# Patient Record
Sex: MALE | Race: WHITE | HISPANIC OR LATINO | Employment: FULL TIME | URBAN - METROPOLITAN AREA
[De-identification: names, ages, dates, MRNs, and addresses within clinical notes are randomized per-mention and may not be internally consistent; named-entity substitution may affect disease eponyms.]

---

## 2017-03-13 ENCOUNTER — HOSPITAL ENCOUNTER (EMERGENCY)
Facility: MEDICAL CENTER | Age: 62
End: 2017-03-13
Attending: EMERGENCY MEDICINE
Payer: COMMERCIAL

## 2017-03-13 ENCOUNTER — OCCUPATIONAL MEDICINE (OUTPATIENT)
Dept: URGENT CARE | Facility: PHYSICIAN GROUP | Age: 62
End: 2017-03-13
Payer: COMMERCIAL

## 2017-03-13 VITALS
OXYGEN SATURATION: 98 % | DIASTOLIC BLOOD PRESSURE: 86 MMHG | BODY MASS INDEX: 30.12 KG/M2 | TEMPERATURE: 98 F | WEIGHT: 169.97 LBS | RESPIRATION RATE: 18 BRPM | SYSTOLIC BLOOD PRESSURE: 135 MMHG | HEART RATE: 64 BPM

## 2017-03-13 VITALS — RESPIRATION RATE: 14 BRPM

## 2017-03-13 DIAGNOSIS — T15.91XA EYE FOREIGN BODY, RIGHT, INITIAL ENCOUNTER: ICD-10-CM

## 2017-03-13 DIAGNOSIS — S05.91XA RIGHT EYE INJURY, INITIAL ENCOUNTER: ICD-10-CM

## 2017-03-13 DIAGNOSIS — T15.91XA FOREIGN BODY, EYE, RIGHT, INITIAL ENCOUNTER: ICD-10-CM

## 2017-03-13 DIAGNOSIS — Z02.1 PRE-EMPLOYMENT DRUG SCREENING: ICD-10-CM

## 2017-03-13 LAB
AMP AMPHETAMINE: NORMAL
BREATH ALCOHOL COMMENT: NORMAL
COC COCAINE: NORMAL
INT CON NEG: NEGATIVE
INT CON POS: POSITIVE
MET METHAMPHETAMINES: NORMAL
OPI OPIATES: NORMAL
PCP PHENCYCLIDINE: NORMAL
POC BREATHALIZER: 0 PERCENT (ref 0–0.01)
POC DRUG COMMENT 753798-OCCUPATIONAL HEALTH: NORMAL
THC: NORMAL

## 2017-03-13 PROCEDURE — 99284 EMERGENCY DEPT VISIT MOD MDM: CPT

## 2017-03-13 PROCEDURE — 99202 OFFICE O/P NEW SF 15 MIN: CPT | Mod: 29 | Performed by: PHYSICIAN ASSISTANT

## 2017-03-13 PROCEDURE — 80305 DRUG TEST PRSMV DIR OPT OBS: CPT | Performed by: PHYSICIAN ASSISTANT

## 2017-03-13 PROCEDURE — 65205 REMOVE FOREIGN BODY FROM EYE: CPT

## 2017-03-13 PROCEDURE — 82075 ASSAY OF BREATH ETHANOL: CPT | Mod: 29 | Performed by: PHYSICIAN ASSISTANT

## 2017-03-13 PROCEDURE — 303754 HCHG EYE PATCH

## 2017-03-13 RX ORDER — BACITRACIN 500 [USP'U]/G
OINTMENT OPHTHALMIC
Qty: 1 TUBE | Refills: 0 | Status: SHIPPED | OUTPATIENT
Start: 2017-03-13 | End: 2023-05-02

## 2017-03-13 RX ORDER — ERYTHROMYCIN 5 MG/G
1 OINTMENT OPHTHALMIC 3 TIMES DAILY
Qty: 1 TUBE | Refills: 0 | Status: SHIPPED | OUTPATIENT
Start: 2017-03-13 | End: 2017-03-13

## 2017-03-13 RX ORDER — HYDROCODONE BITARTRATE AND ACETAMINOPHEN 5; 325 MG/1; MG/1
1 TABLET ORAL EVERY 4 HOURS PRN
Qty: 10 TAB | Refills: 0 | Status: SHIPPED | OUTPATIENT
Start: 2017-03-13

## 2017-03-13 ASSESSMENT — PAIN SCALES - GENERAL
PAINLEVEL_OUTOF10: 4
PAINLEVEL_OUTOF10: 2
PAINLEVEL: 6=MODERATE PAIN

## 2017-03-13 ASSESSMENT — ENCOUNTER SYMPTOMS
NAUSEA: 0
EYE PAIN: 1
BLURRED VISION: 1
NEUROLOGICAL NEGATIVE: 1
EYE REDNESS: 1
EYE REDNESS: 1
VOMITING: 0
BLURRED VISION: 1
EYE PAIN: 1
CONSTITUTIONAL NEGATIVE: 1

## 2017-03-13 ASSESSMENT — LIFESTYLE VARIABLES: DO YOU DRINK ALCOHOL: NO

## 2017-03-13 NOTE — Clinical Note
EMPLOYEE’S CLAIM FOR COMPENSATION/ REPORT OF INITIAL TREATMENT  FORM C-4    EMPLOYEE’S CLAIM - PROVIDE ALL INFORMATION REQUESTED   First Name  Salvador Last Name  Rachel Birthdate                    1955                Sex  male Claim Number   Home Address  [address] Age  61 y.o. Height  5'3 Weight  169 lbs [facility]     [facility] Zip  [address] Telephone  [phone] (home)    Mailing Address  [address] [facility] Zip  [address] Primary Language Spoken  English    Insurer   Third Party   [employer]   Employee's Occupation (Job Title) When Injury or Occupational Disease Occurred      Employer's Name  [employer]  Telephone  [phone]    Employer Address  [address]  Zip  [address]    Date of Injury  3/10/2017               Hour of Injury  12:30 AM Date Employer Notified  3/12/2017 Last Day of Work after Injury or Occupational Disease  3/13/2017 Supervisor to Whom Injury Reported  [facility]   Address or Location of Accident (if applicable)  [place]   What were you doing at the time of accident? (if applicable)  RUNNING THE WHEEL[employer]RATOR    How did this injury or occupational disease occur? (Be specific an answer in detail. Use additional sheet if necessary)  Grit Flew Into Right Eye   If you believe that you have an occupational disease, when did you first have knowledge of the disability and it relationship to your employment?  N/A Witnesses to the Accident  None      Nature of Injury or Occupational Disease  Foreign Body  Part(s) of Body Injured or Affected  Eye (R), ,     I certify that the above is true and correct to the best of my knowledge and that I have provided this information in order to obtain the benefits of [place]  I hereby authorize any physician, chiropractor, surgeon, practitioner, or other person, any hospital, including [facility] or [facility] hospital, any medical service organization, any insurance company, or other institution or organization to release to each other, any medical or other information, including benefits paid or payable, pertinent to this injury or disease, except information relative to diagnosis, treatment and/or counseling for AIDS, psychological conditions, alcohol or controlled substances, for which I must give specific authorization.  A Photostat of this authorization shall be as valid as the original.     Date   Place   Employee’s Signature   THIS REPORT MUST BE COMPLETED AND MAILED WITHIN 3 WORKING DAYS OF TREATMENT   Place  [facility]  Name of Facility  [facility]   Date  3/13/2017 Diagnosis  (S05.91XA) Right eye injury, initial encounter  (T15.91XA) Foreign body, eye, right, initial encounter Is there evidence the injured employee was under the influence of alcohol and/or another controlled substance at the time of accident?   Hour  9:53 AM Description of Injury or Disease  Diagnoses of Right eye injury, initial encounter and Foreign body, eye, right, initial encounter were pertinent to this visit. No   Treatment  Patient referred to ED for further eye care immediately     Unable to do adequate exam and likely FB removal.    Referred to [facility] ED for availability of Ophthamology as deemed appropriate by ERP.   Have you advised the patient to remain off work five days or more? No   X-Ray Findings      If Yes   From Date  To Date      From information given by the employee, together with medical evidence, can you directly connect this injury or occupational disease as job incurred?  Yes If No Full Duty  No Modified Duty  Yes   Is additional medical care by a physician indicated?  Yes If Modified Duty, Specify any  "Limitations / Restrictions  Determine degree after ED visit and final dx    Do you know of any previous injury or disease contributing to this condition or occupational disease?                            No   Date  3/13/2017 Print Doctor’s Name Mk Crews PA-C I certify the employer’s copy of  this form was mailed on:   Address  [address] Insurer’s Use Only     [place] Zip  [address]    Provider’s Tax ID Number  [id number]  Telephone  Dept: [phone]        e-MK Villanueva PA-C   e-Signature: Dr. Anish Pugh, Medical Director Degree  ROG[employer]        ORIGINAL-TREATING PHYSICIAN OR CHIROPRACTOR    PAGE 2-INSURER/TPA    PAGE 3-EMPLOYER    PAGE 4-EMPLOYEE             Form C-4 (rev10/07)              BRIEF DESCRIPTION OF RIGHTS AND BENEFITS  (Pursuant to [place]RS 616C.050)    Notice of Injury or Occupational Disease (Incident Report Form C-1): If an injury or occupational disease (OD) arises out of and in the  course of employment, you must provide written notice to your employer as soon as practicable, but no later than 7 days after the accident or  OD. Your employer shall maintain a sufficient supply of the required forms.    Claim for Compensation (Form C-4): If medical treatment is sought, the form C-4 is available at the place of initial treatment. A completed  \"Claim for Compensation\" (Form C-4) must be filed within 90 days after an accident or OD. The treating physician or chiropractor must,  within 3 working days after treatment, complete and mail to the employer, the employer's insurer and third-party , the Claim for  Compensation.    Medical Treatment: If you require medical treatment for your on-the-job injury or OD, you may be required to select a physician or  chiropractor from a list provided by your workers’ compensation insurer, if it has contracted with an Organization for Managed Care (MCO) or  Preferred Provider Organization (PPO) or " providers of health care. If your employer has not entered into a contract with an MCO or PPO, you  may select a physician or chiropractor from the Panel of Physicians and Chiropractors. Any medical costs related to your industrial injury or  OD will be paid by your insurer.    Temporary Total Disability (TTD): If your doctor has certified that you are unable to work for a period of at least 5 consecutive days, or 5  cumulative days in a 20-day period, or places restrictions on you that your employer does not accommodate, you may be entitled to TTD  compensation.    Temporary Partial Disability (TPD): If the wage you receive upon reemployment is less than the compensation for TTD to which you are  entitled, the insurer may be required to pay you TPD compensation to make up the difference. TPD can only be paid for a maximum of 24  months.    Permanent Partial Disability (PPD): When your medical condition is stable and there is an indication of a PPD as a result of your injury or  OD, within 30 days, your insurer must arrange for an evaluation by a rating physician or chiropractor to determine the degree of your PPD. The  amount of your PPD award depends on the date of injury, the results of the PPD evaluation and your age and wage.    Permanent Total Disability (PTD): If you are medically certified by a treating physician or chiropractor as permanently and totally disabled  and have been granted a PTD status by your insurer, you are entitled to receive monthly benefits not to exceed 66 2/3% of your average  monthly wage. The amount of your PTD payments is subject to reduction if you previously received a PPD award.    Vocational Rehabilitation Services: You may be eligible for vocational rehabilitation services if you are unable to return to the job due to a  permanent physical impairment or permanent restrictions as a result of your injury or occupational disease.    Transportation and Per Shobha Reimbursement: You  may be eligible for travel expenses and per john associated with medical treatment.    Reopening: You may be able to reopen your claim if your condition worsens after claim closure.    Appeal Process: If you disagree with a written determination issued by the insurer or the insurer does not respond to your request, you may  appeal to the Department of Administration, , by following the instructions contained in your determination letter. You must  appeal the determination within 70 days from the date of the determination letter at [address], [address], [place], [place]  [address], or [address], [address], [place], [place] [address]. If you disagree with the  decision, you may appeal to the  Department of Administration, . You must file your appeal within 30 days from the date of the  decision  letter at [address], [address], [place], [place] [address], or [address], [address], [place], [place] [address]. If you  disagree with a decision of an , you may file a petition for judicial review with the District Court. You must do so within 30  days of the Appeal Officer’s decision. You may be represented by an  at your own expense or you may contact the [facility] for possible  representation.    [place]  for Injured Workers (NAIW): If you disagree with a  decision, you may request that NAIW represent you  without charge at an  Hearing. For information regarding denial of benefits, you may contact the [facility] at: [address], [address], [place] [address], [phone], or [address], [address], [place] [address], [phone]    To File a Complaint with the Division: If you wish to file a complaint with the  of the Division of Industrial Relations (DIR),  please contact the Workers’ Compensation Section, [address],  [address], [place], [place] [address], telephone ([phone]) [phone], or  [address], [address], [place], [place] [address], telephone [phone].    For assistance with Workers’ Compensation Issues: you may contact the Office of the Governor Consumer Health Assistance, [address] [place] [address], [address], [place], [place] [address], Toll Free 1-284.875.4151, Web site: http://govcha.[facility].nv., E-mail  [email or web address].                                                                                                                                                                                                                                   __________________________________________________________________                                                                   _________________                Employee Name / Signature                                                                                                                                                       Date                                                                                                                                                                                                     D-2 (rev. 10/07)

## 2017-03-13 NOTE — LETTER
[facility], EMERGENCY DEPT   [address], [place] [address]  Phone: Dept: [phone] - Fax:        [facility] Progress Report and Disability Certification  Date of Service: 3/13/2017   No Show:  No  Date / Time of Next Visit:     Claim Information   Patient Name: Salvador Ramos  Claim Number:     Employer: [employer]  Date of Injury:      Insurer / TPA:   ID / SSN: [id number]    Occupation:  Diagnosis: The encounter diagnosis was Eye foreign body, right, initial encounter.    Medical Information   Related to Industrial Injury?   ***   Subjective Complaints:      Objective Findings:     Pre-Existing Condition(s):     Assessment:        Status:    Permanent Disability:     Plan:      Diagnostics:      Comments:       Disability Information   Status:      From:     Through:   Restrictions are:     Physical Restrictions   Sitting:    Standing:    Stooping:    Bending:      Squatting:    Walking:    Climbing:    Pushing:      Pulling:    Other:    Reaching Above Shoulder (L):   Reaching Above Shoulder (R):       Reaching Below Shoulder (L):    Reaching Below Shoulder (R):      Not to exceed Weight Limits   Carrying(hrs):   Weight Limit(lb):   Lifting(hrs):   Weight  Limit(lb):     Comments:      Repetitive Actions   Hands: i.e. Fine Manipulations from Grasping:     Feet: i.e. Operating Foot Controls:     Driving / Operate Machinery:     Physician Name: Isaak Pryor Physician Signature: ISAAK Bennett M.D. e-Signature:  , Medical Director   Clinic Name / Location: [facility], EMERGENCY DEPT  [address] [place] [id number] [phone]     Clinic Phone Number: Dept: [phone]   Appointment Time:  Visit Start Time:    Check-In Time:  10:58 AM Visit Discharge Time:    Original-Treating Physician or Chiropractor    Page 2-Insurer/TPA    Page 3-Employer    Page 4-Employee

## 2017-03-13 NOTE — LETTER
[facility], EMERGENCY DEPT   [address] [place], [place] [address]  Phone: Dept: [phone] - Fax:        [facility] Progress Report and Disability Certification  Date of Service: 3/13/2017   No Show:  No  Date / Time of Next Visit:     Claim Information   Patient Name: Salvador Ramos  Claim Number:     Employer: [employer]  Date of Injury:      Insurer / TPA:   ID / SSN: [id number]    Occupation:  Diagnosis: The encounter diagnosis was Eye foreign body, right, initial encounter.    Medical Information   Related to Industrial Injury?  yes   Subjective Complaints:      Objective Findings:     Pre-Existing Condition(s):     Assessment:        Status:    Permanent Disability:     Plan:      Diagnostics:      Comments:       Disability Information   Status:      From:     Through:   Restrictions are:     Physical Restrictions   Sitting:    Standing:    Stooping:    Bending:      Squatting:    Walking:    Climbing:    Pushing:      Pulling:    Other:    Reaching Above Shoulder (L):   Reaching Above Shoulder (R):       Reaching Below Shoulder (L):    Reaching Below Shoulder (R):      Not to exceed Weight Limits   Carrying(hrs):   Weight Limit(lb):   Lifting(hrs):   Weight  Limit(lb):     Comments:      Repetitive Actions   Hands: i.e. Fine Manipulations from Grasping:     Feet: i.e. Operating Foot Controls:     Driving / Operate Machinery:     Physician Name: Isaak Pryor Physician Signature: ISAAK Bennett M.D. e-Signature:  , Medical Director   Clinic Name / Location: [facility], EMERGENCY DEPT  [address] [place] [id number] [phone]     Clinic Phone Number: Dept: [phone]   Appointment Time:  Visit Start Time:    Check-In Time:  10:58 AM Visit Discharge Time:    Original-Treating Physician or Chiropractor    Page 2-Insurer/TPA    Page 3-Employer    Page 4-Employee

## 2017-03-13 NOTE — LETTER
FORM C-4:  EMPLOYEE’S CLAIM FOR COMPENSATION/ REPORT OF INITIAL TREATMENT  EMPLOYEE’S CLAIM - PROVIDE ALL INFORMATION REQUESTED   First Name  Salvador Last Name  Rachel Birthdate             Age  1955 61 y.o. 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 (if applicable)      How did this injury or occupational disease occur? Be specific and answer in detail. Use additional sheet if necessary)     If you believe that you have an occupational disease, when did you first have knowledge of the disability and it relationship to your employment?   Witnesses to the Accident       Nature of Injury or Occupational Disease    Part(s) of Body Injured or Affected  , ,     I certify that the above is true and correct to the best of my knowledge and that I have provided this information in order to obtain the benefits of [place]  I hereby authorize any physician, chiropractor, surgeon, practitioner, or other person, any hospital, including  [facility] or [facility] hospital, any medical service organization, any insurance company, or other institution or organization to release to each other, any medical or other information, including benefits paid or payable, pertinent to this injury or disease, except information relative to diagnosis, treatment and/or counseling for AIDS, psychological conditions, alcohol or controlled substances, for which I must give specific authorization.  A Photostat of this authorization shall be as valid as the original.   Date Place   Employee’s Signature   THIS REPORT MUST BE COMPLETED AND MAILED WITHIN 3 WORKING DAYS OF TREATMENT   Place  [facility], EMERGENCY DEPT  Name of Facility   [facility]   Date  3/13/2017 Diagnosis  No diagnosis found. Is there evidence the injured employee was under the influence of alcohol and/or another controlled substance at the time of accident?   Hour  3:19 PM Description of Injury or Disease       Treatment     Have you advised the patient to remain off work five days or more?             X-Ray Findings      If Yes   From Date    To Date      From information given by the employee, together with medical evidence, can you directly connect this injury or occupational disease as job incurred?    If No, is the employee capable of: Full Duty    Modified Duty      Is additional medical care by a physician indicated?    If Modified Duty, Specify any Limitations / Restrictions        Do you know of any previous injury or disease contributing to this condition or occupational disease?      Date  3/13/2017 Print Doctor’s Name  Isaak Pryor certify the employer’s copy of this form was mailed on:   Address  [address] [place] [address] [phone] Insurer’s Use Only   [facility]  [address]    Provider’s Tax ID Number  [id number] Telephone  Dept: [phone]    Doctor’s Signature    Degree       Original -  "TREATING PHYSICIAN OR CHIROPRACTOR   Pg 2-Insurer/TPA   Pg 3-Employer   Pg 4-Employee                                                                                                  Form C-4 (rev01/03)     BRIEF DESCRIPTION OF RIGHTS AND BENEFITS  (Pursuant to [place] 616[place])    Notice of Injury or Occupational Disease (Incident Report Form C-1): If an injury or occupational disease (OD) arises out of and in the course of employment, you must provide written notice to your employer as soon as practicable, but no later than 7 days after the accident or OD. Your employer shall maintain a sufficient supply of the required forms.    Claim for Compensation (Form C-4): If medical treatment is sought, the form C-4 is available at the place of initial treatment. A completed \"Claim for Compensation\" (Form C-4) must be filed within 90 days after an accident or OD. The treating physician or chiropractor must, within 3 working days after treatment, complete and mail to the employer, the employer's insurer and third-party , the Claim for Compensation.    Medical Treatment: If you require medical treatment for your on-the-job injury or OD, you may be required to select a physician or chiropractor from a list provided by your workers’ compensation insurer, if it has contracted with an Organization for Managed Care (MCO) or Preferred Provider Organization (PPO) or providers of health care. If your employer has not entered into a contract with an MCO or PPO, you may select a physician or chiropractor from the Panel of Physicians and Chiropractors. Any medical costs related to your industrial injury or OD will be paid by your insurer.    Temporary Total Disability (TTD): If your doctor has certified that you are unable to work for a period of at least 5 consecutive days, or 5 cumulative days in a 20-day period, or places restrictions on you that your employer does not accommodate, you may be entitled to TTD " compensation.    Temporary Partial Disability (TPD): If the wage you receive upon reemployment is less than the compensation for TTD to which you are entitled, the insurer may be required to pay you TPD compensation to make up the difference. TPD can only be paid for a maximum of 24 months.    Permanent Partial Disability (PPD): When your medical condition is stable and there is an indication of a PPD as a result of your injury or OD, within 30 days, your insurer must arrange for an evaluation by a rating physician or chiropractor to determine the degree of your PPD. The amount of your PPD award depends on the date of injury, the results of the PPD evaluation and your age and wage.    Permanent Total Disability (PTD): If you are medically certified by a treating physician or chiropractor as permanently and totally disabled and have been granted a PTD status by your insurer, you are entitled to receive monthly benefits not to exceed 66 2/3% of your average monthly wage. The amount of your PTD payments is subject to reduction if you previously received a PPD award.    Vocational Rehabilitation Services: You may be eligible for vocational rehabilitation services if you are unable to return to the job due to a permanent physical impairment or permanent restrictions as a result of your injury or occupational disease.    Transportation and Per John Reimbursement: You may be eligible for travel expenses and per john associated with medical treatment.  Reopening: You may be able to reopen your claim if your condition worsens after claim closure.    Appeal Process: If you disagree with a written determination issued by the insurer or the insurer does not respond to your request, you may appeal to the Department of Administration, , by following the instructions contained in your determination letter. You must appeal the determination within 70 days from the date of the determination letter at [address], [address], [place], [place] [address], or [address], [address], [place], [place] [address]. If you disagree with the  decision, you may appeal to the Department of Administration, . You must file your appeal within 30 days from the date of the  decision letter at [address], [address], [place], [place] [address], or [address], [address], [place], [place] [address]. If you disagree with a decision of an , you may file a petition for judicial review with the District Court. You must do so within 30 days of the Appeal Officer’s decision. You may be represented by an  at your own expense or you may contact the [facility] for possible representation.    [place]  for Injured Workers (NAIW): If you disagree with a  decision, you may request that NAIW represent you without charge at an  Hearing. For information regarding denial of benefits, you may contact the [facility] at: [address], [address], [place] [address], [phone], or [address], [address], [place] [address], [phone]    To File a Complaint with the Division: If you wish to file a complaint with the  of the Division of Industrial Relations (DIR), please contact the Workers’ Compensation Section, [address], [address], [place], [place] [address], telephone [phone], or [address] [facility] [address] [place], [place] [address], telephone [phone].    For assistance with Workers’ Compensation Issues: you may contact the Office of the Governor Consumer Health Assistance, [address], [address], [place], [place] [address], Toll Free 1-783.675.6225, Web site: http://govcha.[facility].nv., E-mail torsten@govcha.[facility].nv.                                                                                                                                                                                __________________________________________________________________                                    _________________            Employee Name / Signature                                                                                                                            Date                                       D-2 (rev. 10/07)

## 2017-03-13 NOTE — ED NOTES
Pt ambulatory to room 67 with   Chief Complaint   Patient presents with   • Foreign Body in Eye     rt eye     Agree with triage note. Pt seen at  earlier and sent here for evaluation.   Pt states his vision is blurry from both eyes, sensitive to light. Sclera in RT eye noted to be red.   Chart up for eval.

## 2017-03-13 NOTE — ED AVS SNAPSHOT
Home Care Instructions                                                                                                                Salvador Ramos   MRN: [id number]    Department:  [facility], Emergency Dept   Date of Visit:  3/13/2017            [facility], Emergency Dept    [address]    [place] [address]    Phone:  [phone]      You were seen by     Isaak Pryor M.D.      Your Diagnosis Was     Eye foreign body, right, initial encounter     T15.91XA Embedded      Follow-up Information     1. Follow up with Bruce Zuniga M.D.. Schedule an appointment as soon as possible for a visit today.    Specialty:  Ophthalmology    Contact information    [address]  [place] [address]  [phone]        Medication Information     Review all of your home medications and newly ordered medications with your primary doctor and/or pharmacist as soon as possible. Follow medication instructions as directed by your doctor and/or pharmacist.     Please keep your complete medication list with you and share with your physician. Update the information when medications are discontinued, doses are changed, or new medications (including over-the-counter products) are added; and carry medication information at all times in the event of emergency situations.               Medication List      START taking these medications        Instructions    Morning Afternoon Evening Bedtime    bacitracin 500 UNIT/GM Oint        Use in the right eye 4 times a day.                        hydrocodone-acetaminophen 5-325 MG Tabs per tablet   Commonly known as:  NORCO        Take 1 Tab by mouth every four hours as needed.   Dose:  1 Tab                             Where to Get Your Medications      You can get these medications from any pharmacy     Bring a paper prescription for each of these medications    - bacitracin 500 UNIT/GM Oint  - hydrocodone-acetaminophen 5-325 MG Tabs per tablet            Procedures and tests performed during your visit     NURSING COMMUNICATION        Discharge Instructions       Eye Patch  There are many reasons for you to wear an eye patch, and different eye patches for each reason.  PROTECTION  If your eye has been injured or has undergone surgery:   · Your eye may be vulnerable to infection or greater injury, until it heals.  · After surgery, your doctor may want you to wear an eye patch, to prevent your eye from getting infected or wet, which increases the chance of infection.  · After surgery, if your eye needed stitches (sutures) to close an incision, a patch may be needed to prevent infection. A patch also prevents the possibility that the sutures might come apart, from something touching or rubbing the eye.  Do not drive or operate machinery while wearing a patch. Remember that having one eye covered eliminates your depth perception and your ability to  distances.  TYPES OF PATCHES  Hard shell patches:  Many eye specialists like to be extra careful, and will have you use a hard shell covering over a patch. Or they will have you use the hard shell by itself, over your eye, if they feel it is safe for your eye to be open. This type of patch adds extra protection from any blow to the eye area.  Pressure patches:  A pressure patch is used in specific situations. For example, it is used when the doctor wants the surface of the clear covering at the front of the eye (cornea), to heal rapidly. The patch stops any interference from the normal blinking of the eye. The pressure patch prevents blinking, allowing the cornea surface to heal.   A pressure patch is usually thick, and taped in a special way to your cheek and forehead, so that constant pressure is applied to your eye. It must be put on properly, to avoid too much pressure. Too much pressure can damage the eye. Too little pressure allows the eyelid to move under the patch.  Cosmetic patches:  People may use patches  "for cosmetic reasons, to hide an unsightly or absent eye.  SEEK IMMEDIATE MEDICAL CARE IF:  · You have increased eye pain.  · You develop a discharge from your eye, that is clear and watery or thick in consistency.  · Your pressure patch loosens up, and needs to be replaced.     This information is not intended to replace advice given to you by your health care provider. Make sure you discuss any questions you have with your health care provider.     Document Released: 09/09/2005 Document Revised: 03/11/2013 Document Reviewed: 11/05/2010  [employer] Interactive Patient Education ©2016 [employer]    Conjunctival Foreign Body  A small foreign body was removed from your eye. At this time there does not appear to be damage to your eye. Specks of metal, sand, or wood commonly cause this injury. It often occurs during windy weather and when working with power tools. Sometimes a medication like Novocain (a local anesthetic), is used to remove a foreign body. This local anesthetic is a medication that makes the tissues around the eye numb. Your eye may be uncomfortable when the local anesthetic wears off. This is especially true if the cornea was scratched. The cornea is the very sensitive clear membrane over the front of the eye. Blinking the eye may increase the pain. Sometimes a patch is applied for comfort. The more you rest your \"good eye\", the better both eyes will feel.  HOME CARE INSTRUCTIONS   The use of eye patches varies from state to state and from caregiver to caregiver. If eye patch was applied:  · Keep your eye patch on for as long as directed by your caregiver until your follow-up appointment.  · Do NOT remove the patch unless instructed to do so to put in medications; replace patch and re-tape it as it was before. Follow the same procedure if the patch becomes loose.  · WARNING: Do not drive or operate machinery while your eye is patched. Your ability to  distances is impaired.  If no eye patch was " applied:  · Keep your eye closed as much as possible if there is discomfort.  · Do not rub your eye.  · Wear dark glasses for as long as directed by your caregiver to protect your eyes from bright light.  · Do not wear contact lenses until instructed to do so.  · Wear protective eye covering if your job or hobby involves the risk of eye injury. This is especially important when working with high speed tools.  · Only take over-the-counter or prescription medicines for pain, discomfort, or fever as directed by your caregiver.  · It is important for you to monitor your return to good health. Look at your eye periodically to determine if there is any change in your condition.  SEEK IMMEDIATE MEDICAL CARE IF:   · Pain increases in your eye or your vision changes.  · You have problems with your eye patch.  · The injury to your eye seems to be getting worse. In particular if the area around the site of the foreign body is changing color or seems to be getting larger.  · You develop any kind of discharge from the injured eye, or there is fluid leaking from the eye.  · Swelling and/or soreness (inflammation) develops around the affected eye.  · An oral temperature above 102° F (38.9° C) develops.  MAKE SURE YOU:   · Understand these instructions.  · Will watch your condition.  · Will get help right away if you are not doing well or get worse.    You have an appointment tomorrow morning at 8:00 with Dr. Zuniga.  Document Released: 11/30/2007 Document Revised: 03/11/2013 Document Reviewed: 12/02/2008  [employer]® Patient Information ©2014 [email or web address].            Patient Information     Patient Information    Following emergency treatment: all patient requiring follow-up care must return either to a private physician or a clinic if your condition worsens before you are able to obtain further medical attention, please return to the emergency room.     Billing Information    At [facility], we work to make the billing process  streamlined for our patients.  Our Representatives are here to answer any questions you may have regarding your hospital bill.  If you have insurance coverage and have supplied your insurance information to us, we will submit a claim to your insurer on your behalf.  Should you have any questions regarding your bill, we can be reached online or by phone as follows:  Online: You are able pay your bills online or live chat with our representatives about any billing questions you may have. We are here to help Monday - Friday from 8:00am to 7:30pm and 9:00am - 12:00pm on Saturdays.  Please visit [email or web address]  for more information.   Phone:  [phone] or [phone]    Please note that your emergency physician, surgeon, pathologist, radiologist, anesthesiologist, and other specialists are not employed by [facility] and will therefore bill separately for their services.  Please contact them directly for any questions concerning their bills at the numbers below:     Emergency Physician Services:  [phone]  [facility]:  [phone]  [facility]:  [phone]  [facility]:  [phone]    1. Your final bill may vary from the amount quoted upon discharge if all procedures are not complete at that time, or if your doctor has additional procedures of which we are not aware. You will receive an additional bill if you return to the Emergency Department at [facility] for suture removal regardless of the facility of which the sutures were placed.     2. Please arrange for settlement of this account at the emergency registration.    3. All self-pay accounts are due in full at the time of treatment.  If you are unable to meet this obligation then payment is expected within 4-5 days.     4. If you have had radiology studies (CT, X-ray, Ultrasound, MRI), you have received a preliminary result during your emergency department visit.  Please contact the radiology department [phone] to receive a copy of your final result. Please discuss the Final result with your primary physician or with the follow up physician provided.     Crisis Hotline:  [place] Crisis Hotline:  6-589-HAVMCZY or 1-764.426.6891  [place] Crisis Hotline:    1-822.253.8311 or 981-456-4386         ED Discharge Follow Up Questions    1. In order to provide you with very good care, we would like to follow up with a phone call in the next few days.  May we have your permission to contact you?     YES /  NO    2. What is the best phone number to call you? (       )_____-__________    3. What is the best time to call you?      Morning  /  Afternoon  /  Evening                   Patient Signature:  ____________________________________________________________    Date:  ____________________________________________________________

## 2017-03-13 NOTE — ED NOTES
Reviewed discharge instructions, verbalized understanding of instructions and medications. States he will follow-up with opthalmology tomorrow morning as scheduled. No further questions at this time. Ambulatory out of ER with stable gait.

## 2017-03-13 NOTE — ED NOTES
"PT ambulated to triage c/o metal in the rt eye.  PT reports he was working on Friday and got metal in his eye, he reports over the weekend his eye \"felt better\". PT worked last night and stated now he can feel something in the eye  Chief Complaint   Patient presents with   • Foreign Body in Eye     rt eye     Blood pressure 146/86, pulse 72, temperature 36.7 °C (98 °F), temperature source Temporal, resp. rate 18, weight 77.1 kg (169 lb 15.6 oz), SpO2 96 %.    "

## 2017-03-13 NOTE — DISCHARGE INSTRUCTIONS
Eye Patch  There are many reasons for you to wear an eye patch, and different eye patches for each reason.  PROTECTION  If your eye has been injured or has undergone surgery:   · Your eye may be vulnerable to infection or greater injury, until it heals.  · After surgery, your doctor may want you to wear an eye patch, to prevent your eye from getting infected or wet, which increases the chance of infection.  · After surgery, if your eye needed stitches (sutures) to close an incision, a patch may be needed to prevent infection. A patch also prevents the possibility that the sutures might come apart, from something touching or rubbing the eye.  Do not drive or operate machinery while wearing a patch. Remember that having one eye covered eliminates your depth perception and your ability to  distances.  TYPES OF PATCHES  Hard shell patches:  Many eye specialists like to be extra careful, and will have you use a hard shell covering over a patch. Or they will have you use the hard shell by itself, over your eye, if they feel it is safe for your eye to be open. This type of patch adds extra protection from any blow to the eye area.  Pressure patches:  A pressure patch is used in specific situations. For example, it is used when the doctor wants the surface of the clear covering at the front of the eye (cornea), to heal rapidly. The patch stops any interference from the normal blinking of the eye. The pressure patch prevents blinking, allowing the cornea surface to heal.   A pressure patch is usually thick, and taped in a special way to your cheek and forehead, so that constant pressure is applied to your eye. It must be put on properly, to avoid too much pressure. Too much pressure can damage the eye. Too little pressure allows the eyelid to move under the patch.  Cosmetic patches:  People may use patches for cosmetic reasons, to hide an unsightly or absent eye.  SEEK IMMEDIATE MEDICAL CARE IF:  · You have increased  "eye pain.  · You develop a discharge from your eye, that is clear and watery or thick in consistency.  · Your pressure patch loosens up, and needs to be replaced.     This information is not intended to replace advice given to you by your health care provider. Make sure you discuss any questions you have with your health care provider.     Document Released: 09/09/2005 Document Revised: 03/11/2013 Document Reviewed: 11/05/2010  [employer] Interactive Patient Education ©2016 [employer]    Conjunctival Foreign Body  A small foreign body was removed from your eye. At this time there does not appear to be damage to your eye. Specks of metal, sand, or wood commonly cause this injury. It often occurs during windy weather and when working with power tools. Sometimes a medication like Novocain (a local anesthetic), is used to remove a foreign body. This local anesthetic is a medication that makes the tissues around the eye numb. Your eye may be uncomfortable when the local anesthetic wears off. This is especially true if the cornea was scratched. The cornea is the very sensitive clear membrane over the front of the eye. Blinking the eye may increase the pain. Sometimes a patch is applied for comfort. The more you rest your \"good eye\", the better both eyes will feel.  HOME CARE INSTRUCTIONS   The use of eye patches varies from state to state and from caregiver to caregiver. If eye patch was applied:  · Keep your eye patch on for as long as directed by your caregiver until your follow-up appointment.  · Do NOT remove the patch unless instructed to do so to put in medications; replace patch and re-tape it as it was before. Follow the same procedure if the patch becomes loose.  · WARNING: Do not drive or operate machinery while your eye is patched. Your ability to  distances is impaired.  If no eye patch was applied:  · Keep your eye closed as much as possible if there is discomfort.  · Do not rub your eye.  · Wear dark " glasses for as long as directed by your caregiver to protect your eyes from bright light.  · Do not wear contact lenses until instructed to do so.  · Wear protective eye covering if your job or hobby involves the risk of eye injury. This is especially important when working with high speed tools.  · Only take over-the-counter or prescription medicines for pain, discomfort, or fever as directed by your caregiver.  · It is important for you to monitor your return to good health. Look at your eye periodically to determine if there is any change in your condition.  SEEK IMMEDIATE MEDICAL CARE IF:   · Pain increases in your eye or your vision changes.  · You have problems with your eye patch.  · The injury to your eye seems to be getting worse. In particular if the area around the site of the foreign body is changing color or seems to be getting larger.  · You develop any kind of discharge from the injured eye, or there is fluid leaking from the eye.  · Swelling and/or soreness (inflammation) develops around the affected eye.  · An oral temperature above 102° F (38.9° C) develops.  MAKE SURE YOU:   · Understand these instructions.  · Will watch your condition.  · Will get help right away if you are not doing well or get worse.    You have an appointment tomorrow morning at 8:00 with Dr. Zuniga.  Document Released: 11/30/2007 Document Revised: 03/11/2013 Document Reviewed: 12/02/2008  ExitCare® Patient Information ©2014 [email or web address].

## 2017-03-13 NOTE — ED AVS SNAPSHOT
3/13/2017          Salvador Ramos  [address] [place] [address]    Dear Salvador:    [facility] wants to ensure your discharge home is safe and you or your loved ones have had all your questions answered regarding your care after you leave the hospital.    You may receive a telephone call within two days of your discharge.  This call is to make certain you understand your discharge instructions as well as ensure we provided you with the best care possible during your stay with us.     The call will only last approximately 3-5 minutes and will be done by a nurse.    Once again, we want to ensure your discharge home is safe and that you have a clear understanding of any next steps in your care.  If you have any questions or concerns, please do not hesitate to contact us, we are here for you.  Thank you for choosing [facility] for your healthcare needs.    Sincerely,    Jose F Hernandez    [facility]

## 2017-03-13 NOTE — ED AVS SNAPSHOT
Fl[employer] Access Code: Activation code not generated  Current [employer] Status: Patient Declined    Your email address is not on file at "[employer].  Email Addresses are required for you to sign up for [employer], please contact [phone] to verify your personal information and to provide your email address prior to attempting to register for [employer].    Salvador Ramos  [facility] [place] [address]    Fl[employer] - Engenh[employer] Fluid[employer]  A secure, online tool to manage your health information     "[employer]’s Fluidinova - Engenharia de Fluidos® is a secure, online tool that connects you to your personalized health information from the privacy of your home -- day or night - making it very easy for you to manage your healthcare. Once the activation process is completed, you can even access your medical information using the Fluid[employer] - Engenharia [employer] Fluidos yuri, which is available for free in the Apple Yuri store or Google Play store.     To learn more about Fluid[employer] - Engenharia de Fluid[employer], visit [email or web address] - Engenharia de Fluidos    There are two levels of access available (as shown below):   My Chart Features  [facility] Primary Care Doctor [facility]  Specialists [facility]  Urgent  Care Non-[facility] Primary Care Doctor   Email your healthcare team securely and privately 24/7 X X X    Manage appointments: schedule your next appointment; view details of past/upcoming appointments X      Request prescription refills. X      View recent personal medical records, including lab and immunizations X X X X   View health record, including health history, allergies, medications X X X X   Read reports about your outpatient visits, procedures, consult and ER notes X X X X   See your discharge summary, which is a recap of your hospital and/or ER visit that includes your diagnosis, lab results, and care plan X X  X     How to register for Scrip-tt:  Once your e-mail address has been verified, follow the following steps to sign up for Scrip-tt.     1. Go to  [email or web address]  2. Click on the Sign Up Now box, which takes you to the New Member  Sign Up page. You will need to provide the following information:  a. Enter your Viv[employer] Access Code exactly as it appears at the top of this page. (You will not need to use this code after you’ve completed the sign-up process. If you do not sign up before the expiration date, you must request a new code.)   b. Enter your date of birth.   c. Enter your home email address.   d. Click Submit, and follow the next screen’s instructions.  3. Create a Vivoxid ID. This will be your Viv[employer] login ID and cannot be changed, so think of one that is secure and easy to remember.  4. Create a [employer] password. You can change your password at any time.  5. Enter your Password Reset Question and Answer. This can be used at a later time if you forget your password.   6. Enter your e-mail address. This allows you to receive e-mail notifications when new information is available in Vivoxid.  7. Click Sign Up. You can now view your health information.    For assistance activating your Viv[employer] account, call [phone]

## 2017-03-13 NOTE — PROGRESS NOTES
Subjective:      Salvador Ramos is a 61 y.o. male who presents with No chief complaint on file.      DOI: 03/11/17. 1230  Area affected, right eye.    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 Unfortunately there was some debris that bypassed this and hit his eye.     He rinsed his eye with water immediately and felt ok but when he went home he started to lose his vision later that morning.  He states lots of pain.   He states last night he woke up at 10 last night he could feel a bit better but this morning again he states his vision decreased again and intense pain.  Came here for eval after telling work about it today.   No chronic issues with eyes or vision.  Does not use corrective lenses of any type.      HPI as above.     Review of Systems   Constitutional: Negative.    HENT: Negative.    Eyes: Positive for blurred vision, pain and redness.   Gastrointestinal: Negative for nausea and vomiting.   Neurological: Negative.        PMH:  has no past medical history on file.  MEDS:   Current outpatient prescriptions:   •  methylPREDNISolone (MEDROL DOSPACK) 4 MG TABS, follow package directions, Disp: 1 Each, Rfl: 0  ALLERGIES: No Known Allergies  SURGHX: No past surgical history on file.  SOCHX:  reports that he has never smoked. He does not have any smokeless tobacco history on file.  FH: Family history was reviewed, no pertinent findings to report     Objective:     Resp 14     Physical Exam   Constitutional: He is oriented to person, place, and time. He appears well-developed and well-nourished. No distress.   Cardiovascular: Normal rate.    Pulmonary/Chest: Effort normal.   Neurological: He is alert and oriented to person, place, and time.   Psychiatric: He has a normal mood and affect. His behavior is normal.     Vitals reviewed  Patient with eye patch on upon entering.  Eye closed shut tightly.  Gentle attempts to open eye causing pain unable  to bring eye down to visual iris and pupil due to pain and discomfort.  No drainage.  Able to visualize small particles of metal on eyeball but unable to visualize most of the orbit.  No periorbital swelling.        Assessment/Plan:     1. Right eye injury, initial encounter     2. Foreign body, eye, right, initial encounter         Patient referred to ED for further eye care immediately     Unable to do adequate exam and likely FB removal.    [facility] for availability of Ophthamology as deemed appropriate by ERP.       Diana Crews PA-C

## 2017-03-13 NOTE — ED PROVIDER NOTES
ED Provider Note    Scribed for Isaak Pryor M.D. by Olga Lidia Light. 3/13/2017, 2:43 PM.    Primary care provider: Pcp Pt States None  Means of arrival: Private vehicle  History obtained from: Patient  History limited by: None    CHIEF COMPLAINT  Chief Complaint   Patient presents with   • Foreign Body in Eye     rt eye       HPI  Salvador Ramos is a 61 y.o. male who presents to the Emergency Department sent from Urgent Care complaining of a foreign body in his right eye with associated pain and redness, onset three days ago. The patient states that he got a piece of metal in his eye while at work. Per patient, his vision is blurry when he opens his eye. He is not up to date on his tetanus. He denies any chronic past medical history or daily medications. He has no known allergies.    Review of old medical records shows no previous ED visits.    REVIEW OF SYSTEMS  Review of Systems   Eyes: Positive for blurred vision, pain (right) and redness (right).   All other systems reviewed and are negative.  C.    PAST MEDICAL HISTORY   patient denies any chronic past medical history    SURGICAL HISTORY  patient denies any surgical history    SOCIAL HISTORY  Social History   Substance Use Topics   • Smoking status: Never Smoker    • Smokeless tobacco: None   • Alcohol Use: None      History   Drug Use Not on file       FAMILY HISTORY  No family history noted    CURRENT MEDICATIONS  Home Medications     Reviewed by Rocky Blackwood R.N. (Registered Nurse) on 03/13/17 at 1418  Med List Status: Partial    Medication Last Dose Status          Patient Allen Taking any Medications                        ALLERGIES  No Known Allergies    PHYSICAL EXAM  VITAL SIGNS: /92 mmHg  Pulse 63  Temp(Src) 36.7 °C (98 °F) (Temporal)  Resp 18  Wt 77.1 kg (169 lb 15.6 oz)  SpO2 98%    Constitutional: Alert and oriented x3. Non-toxic appearance.   HENT: Normocephalic, atraumatic, ears normal bilaterally, normal TMs, posterior  pharynx clear with no exudate  Eyes: Conjunctiva is extremely injected, No discharge.   Neck: Supple, normal ROM, no adenopathy  Cardiovascular: Normal heart rate, Normal rhythm, No murmurs, No rubs, No gallops.   Thorax & Lungs: Normal breath sounds, No respiratory distress, No wheezing, No chest tenderness.   Abdomen: Soft, No tenderness, No masses, No pulsatile masses.   Skin: Warm, Dry, No erythema, No rash.   Extremities: Intact distal pulses, No edema, No tenderness, No cyanosis, No clubbing.   Musculoskeletal: Normal ROM, no deformities  Neurologic: Alert & oriented x 3, Normal motor function, No focal deficits noted.    DIAGNOSTIC STUDIES / PROCEDURES    Procedure slit-lamp examination and foreign body removal: Difficulty obtaining anesthesia with drops. Sub patient able to open his eye. Foreign body embedded at 7:00. This was removed with a 22-gauge needle. No other lesions found. Patient treated with erythromycin ointment eyedrop and patch. Past removed this evening.    COURSE & MEDICAL DECISION MAKING  Nursing notes, VS, PMSFHx reviewed in chart.    Review of old medical records shows 1 previous visit in 2012 for bronchitis    2:43 PM - Patient seen and examined at bedside. Drops were placed in the patient's eyes and evaluated with the slit lamp which showed a small piece of metal at 7 o'clock. He was informed that the foreign body will be removed, he understood and verbalized agreement.    3:17 PM Foreign body removed at bedside by ERP.    3:29 PM The patient was informed that he is able to be home with a prescription for eye drops and pain medication. It was discussed with the patient that he is able to be discharged home and that he needs to follow up in the morning with an optometrist. He understood and verbalized agreement.    I reviewed prescription monitoring program for patient's narcotic use before prescribing a scheduled drug.The patient will not drink alcohol nor drive with prescribed  medications. The patient will return for new or worsening symptoms and is stable at the time of discharge.    The patient is referred to a primary physician for blood pressure management, diabetic screening, and for all other preventative health concerns.    I spoke with Dr. Zuniga of ophthalmology. Patient will be seen tomorrow morning in the office at 8:00. He is cyanosis bacitracin ophthalmic ointment 4 times a day. Patient will be seen in the renal office.    DISPOSITION:  Patient will be discharged home in stable condition.    FOLLOW UP:  Bruce Zuniga M.D.  [address]  [facility] [address]  [phone]    Schedule an appointment as soon as possible for a visit today      OUTPATIENT MEDICATIONS:  Discharge Medication List as of 3/13/2017  4:24 PM      START taking these medications    Details   hydrocodone-acetaminophen (NORCO) 5-325 MG Tab per tablet Take 1 Tab by mouth every four hours as needed., Disp-10 Tab, R-0, Print Rx Paper      bacitracin 500 UNIT/GM Ointment Use in the right eye 4 times a day., Disp-1 Tube, R-0, Print Rx Paper           FINAL IMPRESSION  1. Eye foreign body, right, initial encounter     2. Foreign body removed by myself   3. Rest ring removal tomorrow morning     IOlga Lidia (Scribe), am scribing for, and in the presence of, Isaak Pryor M.D..    Electronically signed by: Olga Lidia Light (Scribe), 3/13/2017    IIsaak M.D. personally performed the services described in this documentation, as scribed by Olga Lidia Light in my presence, and it is both accurate and complete.    The note accurately reflects work and decisions made by me.  Isaak Pryor  3/13/2017  5:40 PM

## 2017-03-13 NOTE — MR AVS SNAPSHOT
Salvador Ramos   3/13/2017 9:35 AM   Occupational Medicine   MRN: [id number]    Department:  [facility]   Dept Phone:  [phone]    Description:  Male : 1955   Provider:  Diana Crews PA-C           Allergies as of 3/13/2017     No Known Allergies      You were diagnosed with     Right eye injury, initial encounter   [[id number]]       Foreign body, eye, right, initial encounter   [[id number]]         Vital Signs     Smoking Status                   Never Smoker            Basic Information     Date Of Birth Sex Race Ethnicity Preferred Language    1955 Male  or   Origin ([place] Islander,Dutch,Scottish,Swedish, etc) English      Health Maintenance        Date Due Completion Dates    IMM DTaP/Tdap/Td Vaccine (1 - Tdap) 1972 ---    COLONOSCOPY 2003 ---    IMM ZOSTER VACCINE 2013 ---            Current Immunizations     Influenza Vaccine Quad Inj (Pf) 2016  6:00 AM, 10/14/2015  6:41 AM      Below and/or attached are the medications your provider expects you to take. Review all of your home medications and newly ordered medications with your provider and/or pharmacist. Follow medication instructions as directed by your provider and/or pharmacist. Please keep your medication list with you and share with your provider. Update the information when medications are discontinued, doses are changed, or new medications (including over-the-counter products) are added; and carry medication information at all times in the event of emergency situations     Allergies:  No Known Allergies          Medications  Valid as of: 2017 - 10:42 AM    Generic Name Brand Name Tablet Size Instructions for use    MethylPREDNISolone (Tab) MEDROL DOSPACK 4 MG follow package directions        .                 Medicines prescribed today were sent to:     None      Medication refill instructions:       If your prescription bottle indicates you have medication refills left, it  is not necessary to call your provider’s office. Please contact your pharmacy and they will refill your medication.    If your prescription bottle indicates you do not have any refills left, you may request refills at any time through one of the following ways: The online [employer] system (except Urgent Care), by calling your provider’s office, or by asking your pharmacy to contact your provider’s office with a refill request. Medication refills are processed only during regular business hours and may not be available until the next business day. Your provider may request additional information or to have a follow-up visit with you prior to refilling your medication.   *Please Note: Medication refills are assigned a new Rx number when refilled electronically. Your pharmacy may indicate that no refills were authorized even though a new prescription for the same medication is available at the pharmacy. Please request the medicine by name with the pharmacy before contacting your provider for a refill.           [email or web address] Status: Patient Declined

## 2017-03-13 NOTE — Clinical Note
[facility]   [address] - [place] [address]  Phone: [phone] - Fax: [phone]        [facility] Progress Report and Disability Certification  Date of Service: 3/13/2017   No Show:  No  Date / Time of Next Visit: 3/14/2017   Claim Information   Patient Name: Salvador Ramos  Claim Number:     Employer: [employer]  Date of Injury: 3/10/2017     Insurer / TPA: [employer]  ID / SSN:     Occupation:   Diagnosis: Diagnoses of Right eye injury, initial encounter and Foreign body, eye, right, initial encounter were pertinent to this visit.    Medical Information   Related to Industrial Injury? Yes    Subjective Complaints:  DOI: 03/11/17. 1230  Area affected, right eye.    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 Unfortunately there was some debris that bypassed this and hit his eye.     He rinsed his eye with water immediately and felt ok but when he went home he started to lose his vision later that morning.  He states lots of pain.   He states last night he woke up at 10 last night he could feel a bit better but this morning again he states his vision decreased again and intense pain.  Came here for eval after telling work about it today.   No chronic issues with eyes or vision.  Does not use corrective lenses of any type.    Objective Findings: Vitals reviewed  Patient with eye patch on upon entering.  Eye closed shut tightly.  Gentle attempts to open eye causing pain unable to bring eye down to visual iris and pupil due to pain and discomfort.  No drainage.  Able to visualize small particles of metal on eyeball but unable to visualize most of the orbit.  No periorbital swelling.    Pre-Existing Condition(s): None    Assessment:   Initial Visit    Status: Discharged / Care Transfer  ER trans Permanent Disability:No    Plan:     Diagnostics:       Comments:       Disability Information   Status: Released to Restricted Duty    From:  3/13/2017  Through: 3/14/2017 Restrictions are: Temporary   Physical Restrictions   Sitting:    Standing:    Stooping:    Bending:      Squatting:    Walking:    Climbing:    Pushing:      Pulling:    Other:    Reaching Above Shoulder (L):   Reaching Above Shoulder (R):       Reaching Below Shoulder (L):    Reaching Below Shoulder (R):      Not to exceed Weight Limits   Carrying(hrs):   Weight Limit(lb):   Lifting(hrs):   Weight  Limit(lb):     Comments: Patient referred to ED for further eye care immediately     Unable to do adequate exam and likely FB removal.    Referred to main [facility] ED for availability of Ophthamology as deemed appropriate by ERP.     Repetitive Actions   Hands: i.e. Fine Manipulations from Grasping:     Feet: i.e. Operating Foot Controls:     Driving / Operate Machinery: 0 hrs/day   Physician Name: Mk Crews PA-C Physician Signature: MK Freire PA-C e-Signature: Dr. Anish Pugh, Medical Director   Clinic Name / Location: [address]  [place] [address] Clinic Phone Number: Dept: [phone]   Appointment Time: 9:35 Am Visit Start Time: 9:53 AM   Check-In Time:  9:51 Am Visit Discharge Time:  10.39 AM   Original-Treating Physician or Chiropractor    Page 2-Insurer/TPA    Page 3-Employer    Page 4-Employee

## 2017-03-13 NOTE — LETTER
[facility], EMERGENCY DEPT   [address], [place] [address]  Phone: Dept: [phone] - Fax:        [facility] Progress Report and Disability Certification  Date of Service: 3/13/2017   No Show:  No  Date / Time of Next Visit:     Claim Information   Patient Name: Salvador Ramos  Claim Number:     Employer: [employer]  Date of Injury:      Insurer / TPA:   ID / SSN: [id number]    Occupation:  Diagnosis: The encounter diagnosis was Eye foreign body, right, initial encounter.    Medical Information   Related to Industrial Injury?   ***   Subjective Complaints:      Objective Findings:     Pre-Existing Condition(s):     Assessment:        Status:    Permanent Disability:     Plan:      Diagnostics:      Comments:       Disability Information   Status:      From:     Through:   Restrictions are:     Physical Restrictions   Sitting:    Standing:    Stooping:    Bending:      Squatting:    Walking:    Climbing:    Pushing:      Pulling:    Other:    Reaching Above Shoulder (L):   Reaching Above Shoulder (R):       Reaching Below Shoulder (L):    Reaching Below Shoulder (R):      Not to exceed Weight Limits   Carrying(hrs):   Weight Limit(lb):   Lifting(hrs):   Weight  Limit(lb):     Comments:      Repetitive Actions   Hands: i.e. Fine Manipulations from Grasping:     Feet: i.e. Operating Foot Controls:     Driving / Operate Machinery:     Physician Name: Isaak Pryor Physician Signature: ISAAK Bennett M.D. e-Signature:  , Medical Director   Clinic Name / Location: [facility], EMERGENCY DEPT  [address] [place] [id number] [phone]     Clinic Phone Number: Dept: [phone]   Appointment Time:  Visit Start Time:    Check-In Time:  10:58 AM Visit Discharge Time:    Original-Treating Physician or Chiropractor    Page 2-Insurer/TPA    Page 3-Employer    Page 4-Employee

## 2017-09-06 ENCOUNTER — OFFICE VISIT (OUTPATIENT)
Dept: OCCUPATIONAL MEDICINE | Facility: CLINIC | Age: 62
End: 2017-09-06

## 2017-09-06 ENCOUNTER — NON-PROVIDER VISIT (OUTPATIENT)
Dept: OCCUPATIONAL MEDICINE | Facility: CLINIC | Age: 62
End: 2017-09-06

## 2017-09-06 DIAGNOSIS — Z02.89 ENCOUNTER FOR OCCUPATIONAL HEALTH EXAMINATION: ICD-10-CM

## 2017-09-06 PROCEDURE — 99201 PR OFFICE/OUTPT VISIT,NEW,LEVL I: CPT | Performed by: PREVENTIVE MEDICINE

## 2017-09-06 PROCEDURE — 92552 PURE TONE AUDIOMETRY AIR: CPT | Performed by: PREVENTIVE MEDICINE

## 2017-11-10 ENCOUNTER — HOSPITAL ENCOUNTER (OUTPATIENT)
Facility: MEDICAL CENTER | Age: 62
End: 2017-11-10
Payer: COMMERCIAL

## 2017-11-10 ENCOUNTER — APPOINTMENT (OUTPATIENT)
Dept: SOCIAL WORK | Facility: CLINIC | Age: 62
End: 2017-11-10

## 2017-11-10 LAB
ANION GAP SERPL CALC-SCNC: 6 MMOL/L (ref 0–11.9)
BUN SERPL-MCNC: 15 MG/DL (ref 8–22)
CALCIUM SERPL-MCNC: 8.6 MG/DL (ref 8.5–10.5)
CHLORIDE SERPL-SCNC: 103 MMOL/L (ref 96–112)
CHOLEST SERPL-MCNC: 164 MG/DL (ref 100–199)
CO2 SERPL-SCNC: 29 MMOL/L (ref 20–33)
CREAT SERPL-MCNC: 0.8 MG/DL (ref 0.5–1.4)
GFR SERPL CREATININE-BSD FRML MDRD: >60 ML/MIN/1.73 M 2
GLUCOSE SERPL-MCNC: 97 MG/DL (ref 65–99)
HDLC SERPL-MCNC: 40 MG/DL
LDLC SERPL CALC-MCNC: 64 MG/DL
POTASSIUM SERPL-SCNC: 4.4 MMOL/L (ref 3.6–5.5)
PSA SERPL-MCNC: 7.97 NG/ML (ref 0–4)
SODIUM SERPL-SCNC: 138 MMOL/L (ref 135–145)
TRIGL SERPL-MCNC: 298 MG/DL (ref 0–149)

## 2017-11-10 PROCEDURE — 90686 IIV4 VACC NO PRSV 0.5 ML IM: CPT | Performed by: REGISTERED NURSE

## 2018-10-15 ENCOUNTER — IMMUNIZATION (OUTPATIENT)
Dept: SOCIAL WORK | Facility: CLINIC | Age: 63
End: 2018-10-15

## 2018-10-15 DIAGNOSIS — Z23 NEED FOR VACCINATION: ICD-10-CM

## 2018-10-15 PROCEDURE — 90662 IIV NO PRSV INCREASED AG IM: CPT | Performed by: REGISTERED NURSE

## 2019-03-21 ENCOUNTER — HOSPITAL ENCOUNTER (OUTPATIENT)
Facility: MEDICAL CENTER | Age: 64
End: 2019-03-21
Payer: COMMERCIAL

## 2019-03-21 LAB
CHOLEST SERPL-MCNC: 191 MG/DL (ref 100–199)
FASTING STATUS PATIENT QL REPORTED: NORMAL
GLUCOSE SERPL-MCNC: 97 MG/DL (ref 65–99)
HDLC SERPL-MCNC: 44 MG/DL
LDLC SERPL CALC-MCNC: 112 MG/DL
TRIGL SERPL-MCNC: 175 MG/DL (ref 0–149)

## 2021-03-03 DIAGNOSIS — Z23 NEED FOR VACCINATION: ICD-10-CM

## 2023-02-25 ENCOUNTER — HOSPITAL ENCOUNTER (EMERGENCY)
Facility: MEDICAL CENTER | Age: 68
End: 2023-02-25
Attending: STUDENT IN AN ORGANIZED HEALTH CARE EDUCATION/TRAINING PROGRAM
Payer: COMMERCIAL

## 2023-02-25 VITALS
TEMPERATURE: 97.7 F | BODY MASS INDEX: 30.12 KG/M2 | WEIGHT: 169.97 LBS | OXYGEN SATURATION: 94 % | HEIGHT: 63 IN | HEART RATE: 69 BPM | SYSTOLIC BLOOD PRESSURE: 173 MMHG | DIASTOLIC BLOOD PRESSURE: 85 MMHG | RESPIRATION RATE: 16 BRPM

## 2023-02-25 DIAGNOSIS — R33.9 URINARY RETENTION: ICD-10-CM

## 2023-02-25 LAB
ALBUMIN SERPL BCP-MCNC: 4.5 G/DL (ref 3.2–4.9)
ALBUMIN/GLOB SERPL: 1.2 G/DL
ALP SERPL-CCNC: 100 U/L (ref 30–99)
ALT SERPL-CCNC: 33 U/L (ref 2–50)
ANION GAP SERPL CALC-SCNC: 13 MMOL/L (ref 7–16)
APPEARANCE UR: CLEAR
AST SERPL-CCNC: 32 U/L (ref 12–45)
BACTERIA #/AREA URNS HPF: NEGATIVE /HPF
BASOPHILS # BLD AUTO: 0.2 % (ref 0–1.8)
BASOPHILS # BLD: 0.02 K/UL (ref 0–0.12)
BILIRUB SERPL-MCNC: 0.9 MG/DL (ref 0.1–1.5)
BILIRUB UR QL STRIP.AUTO: NEGATIVE
BUN SERPL-MCNC: 19 MG/DL (ref 8–22)
CALCIUM ALBUM COR SERPL-MCNC: 8.5 MG/DL (ref 8.5–10.5)
CALCIUM SERPL-MCNC: 8.9 MG/DL (ref 8.5–10.5)
CHLORIDE SERPL-SCNC: 97 MMOL/L (ref 96–112)
CO2 SERPL-SCNC: 22 MMOL/L (ref 20–33)
COLOR UR: YELLOW
CREAT SERPL-MCNC: 0.73 MG/DL (ref 0.5–1.4)
EOSINOPHIL # BLD AUTO: 0 K/UL (ref 0–0.51)
EOSINOPHIL NFR BLD: 0 % (ref 0–6.9)
EPI CELLS #/AREA URNS HPF: NEGATIVE /HPF
ERYTHROCYTE [DISTWIDTH] IN BLOOD BY AUTOMATED COUNT: 41.8 FL (ref 35.9–50)
GFR SERPLBLD CREATININE-BSD FMLA CKD-EPI: 98 ML/MIN/1.73 M 2
GLOBULIN SER CALC-MCNC: 3.8 G/DL (ref 1.9–3.5)
GLUCOSE SERPL-MCNC: 157 MG/DL (ref 65–99)
GLUCOSE UR STRIP.AUTO-MCNC: NEGATIVE MG/DL
HCT VFR BLD AUTO: 52.2 % (ref 42–52)
HGB BLD-MCNC: 17.7 G/DL (ref 14–18)
HYALINE CASTS #/AREA URNS LPF: ABNORMAL /LPF
IMM GRANULOCYTES # BLD AUTO: 0.05 K/UL (ref 0–0.11)
IMM GRANULOCYTES NFR BLD AUTO: 0.5 % (ref 0–0.9)
KETONES UR STRIP.AUTO-MCNC: NEGATIVE MG/DL
LEUKOCYTE ESTERASE UR QL STRIP.AUTO: NEGATIVE
LIPASE SERPL-CCNC: 15 U/L (ref 11–82)
LYMPHOCYTES # BLD AUTO: 0.63 K/UL (ref 1–4.8)
LYMPHOCYTES NFR BLD: 5.7 % (ref 22–41)
MCH RBC QN AUTO: 29.3 PG (ref 27–33)
MCHC RBC AUTO-ENTMCNC: 33.9 G/DL (ref 33.7–35.3)
MCV RBC AUTO: 86.3 FL (ref 81.4–97.8)
MICRO URNS: ABNORMAL
MONOCYTES # BLD AUTO: 0.63 K/UL (ref 0–0.85)
MONOCYTES NFR BLD AUTO: 5.7 % (ref 0–13.4)
NEUTROPHILS # BLD AUTO: 9.72 K/UL (ref 1.82–7.42)
NEUTROPHILS NFR BLD: 87.9 % (ref 44–72)
NITRITE UR QL STRIP.AUTO: NEGATIVE
NRBC # BLD AUTO: 0 K/UL
NRBC BLD-RTO: 0 /100 WBC
PH UR STRIP.AUTO: 7 [PH] (ref 5–8)
PLATELET # BLD AUTO: 213 K/UL (ref 164–446)
PMV BLD AUTO: 9.8 FL (ref 9–12.9)
POTASSIUM SERPL-SCNC: 4.1 MMOL/L (ref 3.6–5.5)
PROT SERPL-MCNC: 8.3 G/DL (ref 6–8.2)
PROT UR QL STRIP: NEGATIVE MG/DL
RBC # BLD AUTO: 6.05 M/UL (ref 4.7–6.1)
RBC # URNS HPF: ABNORMAL /HPF
RBC UR QL AUTO: ABNORMAL
SODIUM SERPL-SCNC: 132 MMOL/L (ref 135–145)
SP GR UR STRIP.AUTO: 1.01
UROBILINOGEN UR STRIP.AUTO-MCNC: 0.2 MG/DL
WBC # BLD AUTO: 11.1 K/UL (ref 4.8–10.8)
WBC #/AREA URNS HPF: ABNORMAL /HPF

## 2023-02-25 PROCEDURE — 99284 EMERGENCY DEPT VISIT MOD MDM: CPT

## 2023-02-25 PROCEDURE — 85025 COMPLETE CBC W/AUTO DIFF WBC: CPT

## 2023-02-25 PROCEDURE — 303105 HCHG CATHETER EXTRA

## 2023-02-25 PROCEDURE — 80053 COMPREHEN METABOLIC PANEL: CPT

## 2023-02-25 PROCEDURE — 36415 COLL VENOUS BLD VENIPUNCTURE: CPT

## 2023-02-25 PROCEDURE — 51702 INSERT TEMP BLADDER CATH: CPT

## 2023-02-25 PROCEDURE — 83690 ASSAY OF LIPASE: CPT

## 2023-02-25 PROCEDURE — 81001 URINALYSIS AUTO W/SCOPE: CPT

## 2023-02-25 RX ORDER — TAMSULOSIN HYDROCHLORIDE 0.4 MG/1
0.4 CAPSULE ORAL DAILY
Qty: 30 CAPSULE | Refills: 0 | Status: SHIPPED | OUTPATIENT
Start: 2023-02-25 | End: 2023-03-27 | Stop reason: SDUPTHER

## 2023-02-25 NOTE — ED NOTES
Patient provided discharge instructions and medication information. Patient verbalizes understanding and denies any further questions. Provided patient with urinary drainage leg bag and education how to use. Patient ambulated to the front lobby with all belongings.

## 2023-02-25 NOTE — ED NOTES
Patient ambulated to green 28 with a steady gait and all belongings. Patient placed on the monitor. ERP to see.

## 2023-02-25 NOTE — ED PROVIDER NOTES
ED Provider Note    CHIEF COMPLAINT  Chief Complaint   Patient presents with    Abdominal Pain     Pt states it is lower abd pain, intense pain starting today. Pt states it is his prostate that hurts, recent Dr visit for prostate related issues. Unable to void since yesterday afternoon    Unable to Urinate     Since yesterday around 2pm, feels like there is urine in his bladder, +pressure/pain, pt cannot sit down        EXTERNAL RECORDS REVIEWED  Outpatient Notes was started on Flomax by [facility] [place]    HPI/ROS  LIMITATION TO HISTORY   Select: : None  OUTSIDE HISTORIAN(S):  None    Salvador Ramos is a 67 y.o. male who presents evaluation of suprapubic fullness and difficulty urinating.  Patient states he was being evaluated by urology for possible BPH versus underlying prostatic malignancy after his outpatient office visit around 2 PM yesterday he stated that he has been unable to urinate.  He states he has never had a Mc, does state he was prescribed Flomax but was unable to  the prescription today.  He has noted no fevers no flank pain no vomiting.    PAST MEDICAL HISTORY   has a past medical history of Prostate hypertrophy and Urinary retention with incomplete bladder emptying.    SURGICAL HISTORY  patient denies any surgical history    FAMILY HISTORY  History reviewed. No pertinent family history.    SOCIAL HISTORY  Social History     Tobacco Use    Smoking status: Never    Smokeless tobacco: Not on file   Substance and Sexual Activity    Alcohol use: Never    Drug use: Never    Sexual activity: Not on file       CURRENT MEDICATIONS  Home Medications       Reviewed by Monica Rudd R.N. (Registered Nurse) on 02/25/23 at 0213  Med List Status: Partial     Medication Last Dose Status   bacitracin 500 UNIT/GM Ointment  Active   hydrocodone-acetaminophen (NORCO) 5-325 MG Tab per tablet  Active                    ALLERGIES  No Known Allergies    PHYSICAL EXAM  VITAL SIGNS: BP (!) 173/85    "Pulse 69   Temp 36.1 °C (97 °F) (Temporal)   Resp 14   Ht 1.6 m (5' 3\")   Wt 77.1 kg (169 lb 15.6 oz)   SpO2 94%   BMI 30.11 kg/m²    Pulse ox interpretation: I interpret this pulse ox as normal.  VITALS - vital signs documented prior to this note have been reviewed and noted,  GENERAL - awake, alert, oriented, GCS 15, no apparent distress, non-toxic  appearing  HEENT - normocephalic, atraumatic, pupils equal, sclera anicteric, mucus  membranes moist  NECK - supple, no meningismus, full active range of motion, trachea midline  CARDIOVASCULAR - regular rate/rhythm, no murmurs/gallops/rubs  PULMONARY - no respiratory distress, speaking in full sentences, clear to  auscultation bilaterally, no wheezing/ronchi/rales, no accessory muscle use  GASTROINTESTINAL -suprapubic fullness and tenderness his abdomen is otherwise soft, non-tender, non-distended, no rebound, guarding,  or peritonitis  GENITOURINARY - Deferred  NEUROLOGIC - Awake alert, normal mental status, speech fluid, cognition  normal, moves all extremities  MUSCULOSKELETAL - no obvious asymmetry or deformities present  EXTREMITIES - warm, well-perfused, no cyanosis or significant edema  DERMATOLOGIC - warm, dry, no rashes, no jaundice  PSYCHIATRIC - normal affect, normal insight, normal concentration    DIAGNOSTIC STUDIES / PROCEDURES      LABS  No evidence of underlying renal dysfunction urinalysis does have microscopic hematuria is likely secondary to Mc placement no signs to suggest an infection.  CMP does show mild hyponatremia otherwise nonactionable    RADIOLOGY  I have independently interpreted the diagnostic imaging associated with this visit and am waiting the final reading from the radiologist.   Bedside ultrasound performed showed a grossly distended urinary bladder without significant hydronephrosis bilaterally.    COURSE & MEDICAL DECISION MAKING    ED Observation Status? No; Patient does not meet criteria for ED Observation.     INITIAL " ASSESSMENT, COURSE AND PLAN  Care Narrative: Patient presented for evaluation of abdominal pain and difficulty urinating differential included was not limited to acute urinary retention renal insufficiency postobstructive diuresis urinary tract infection among many other considerations.  On examination the patient does have suprapubic fullness and discomfort a bedside ultrasound was performed showed distended urinary bladder there is no evidence of underlying hydronephrosis Labs were obtained showed no evidence of underlying renal dysfunction urinalysis is negative for signs of infection there is microscopic Madry likely secondary to the patient's Mc placement.  CBC shows a mild leukocytosis of believe is likely secondary to pain, he has no other sirs criteria do not believe to be septic at this point.  After the Mc was placed and drained approximately 1500 cc of clear urine were drained patient's pain completely resolved.  We will leave the Mc in place place a leg bag and discharge patient on Flomax and instructed him to call his urology team in the morning patient did feel comfortable with this plan was discharged in a stable condition  HTN/IDDM FOLLOW UP:  The patient has known hypertension and is being followed by their primary care doctor      ADDITIONAL PROBLEM LIST  1Urinary retention  #2 elevated blood pressure readings history of hypertension, also likely secondary to acute pain  3.  micro scopic hematuria is likely secondary to Mc placement patient is counseled on the importance of follow-up with his urologist in regards to this     DISPOSITION AND DISCUSSIONS  I have discussed management of the patient with the following physicians and AIDAN's:  none    Discussion of management with other QHP or appropriate source(s): None     Escalation of care considered, and ultimately not performed:diagnostic imaging will defer CT on pelvis given that there pain resolved after Mc placement and drainage of  urinary retention    Barriers to care at this time, including but not limited to:  None .     Decision tools and prescription drugs considered including, but not limited to:  Flomax .    FINAL DIAGNOSIS  1. Urinary retention    #2 elevated blood pressure reading  3.  Hyponatremia       Electronically signed by: Marlon Diego D.O., 2/25/2023 3:37 AM

## 2023-02-25 NOTE — ED TRIAGE NOTES
"Chief Complaint   Patient presents with    Abdominal Pain     Pt states it is lower abd pain, intense pain starting today. Pt states it is his prostate that hurts, recent Dr visit for prostate related issues. Unable to void since yesterday afternoon    Unable to Urinate     Since yesterday around 2pm, feels like there is urine in his bladder, +pressure/pain, pt cannot sit down      BP (!) 154/82   Pulse (!) 53   Temp 36.1 °C (97 °F) (Temporal)   Resp 14   Ht 1.6 m (5' 3\")   Wt 77.1 kg (169 lb 15.6 oz)   SpO2 96%   BMI 30.11 kg/m²     Pt to triage for above cc  Pt in obvious discomfort in triage  Had [facility] [place] visit yesterday for prostate related issues and retention, rx sent home for tamsulosin   Pt cannot void at this time  Abd pain protocol ordered  "

## 2023-02-25 NOTE — DISCHARGE INSTRUCTIONS
Follow-up with your urology team begin to take the Flomax if you develop any fevers or worsening pain please return for recheck

## 2023-03-18 ENCOUNTER — HOSPITAL ENCOUNTER (EMERGENCY)
Facility: MEDICAL CENTER | Age: 68
End: 2023-03-18
Attending: EMERGENCY MEDICINE
Payer: COMMERCIAL

## 2023-03-18 VITALS
DIASTOLIC BLOOD PRESSURE: 66 MMHG | HEART RATE: 70 BPM | OXYGEN SATURATION: 94 % | SYSTOLIC BLOOD PRESSURE: 135 MMHG | WEIGHT: 171.96 LBS | RESPIRATION RATE: 17 BRPM | HEIGHT: 66 IN | TEMPERATURE: 97.3 F | BODY MASS INDEX: 27.64 KG/M2

## 2023-03-18 DIAGNOSIS — R82.90 ABNORMAL URINALYSIS: ICD-10-CM

## 2023-03-18 DIAGNOSIS — R33.9 URINARY RETENTION: ICD-10-CM

## 2023-03-18 LAB
APPEARANCE UR: CLEAR
BACTERIA #/AREA URNS HPF: ABNORMAL /HPF
BILIRUB UR QL STRIP.AUTO: NEGATIVE
COLOR UR: YELLOW
EPI CELLS #/AREA URNS HPF: NEGATIVE /HPF
GLUCOSE UR STRIP.AUTO-MCNC: NEGATIVE MG/DL
HYALINE CASTS #/AREA URNS LPF: ABNORMAL /LPF
KETONES UR STRIP.AUTO-MCNC: NEGATIVE MG/DL
LEUKOCYTE ESTERASE UR QL STRIP.AUTO: ABNORMAL
MICRO URNS: ABNORMAL
NITRITE UR QL STRIP.AUTO: POSITIVE
PH UR STRIP.AUTO: 6 [PH] (ref 5–8)
PROT UR QL STRIP: NEGATIVE MG/DL
RBC # URNS HPF: >150 /HPF
RBC UR QL AUTO: ABNORMAL
SP GR UR STRIP.AUTO: 1.01
UROBILINOGEN UR STRIP.AUTO-MCNC: 0.2 MG/DL
WBC #/AREA URNS HPF: ABNORMAL /HPF

## 2023-03-18 PROCEDURE — 87186 SC STD MICRODIL/AGAR DIL: CPT

## 2023-03-18 PROCEDURE — 81001 URINALYSIS AUTO W/SCOPE: CPT

## 2023-03-18 PROCEDURE — 99284 EMERGENCY DEPT VISIT MOD MDM: CPT

## 2023-03-18 PROCEDURE — 51702 INSERT TEMP BLADDER CATH: CPT

## 2023-03-18 PROCEDURE — 303105 HCHG CATHETER EXTRA

## 2023-03-18 PROCEDURE — 87077 CULTURE AEROBIC IDENTIFY: CPT

## 2023-03-18 PROCEDURE — 87086 URINE CULTURE/COLONY COUNT: CPT

## 2023-03-18 PROCEDURE — 700102 HCHG RX REV CODE 250 W/ 637 OVERRIDE(OP): Performed by: EMERGENCY MEDICINE

## 2023-03-18 PROCEDURE — A9270 NON-COVERED ITEM OR SERVICE: HCPCS | Performed by: EMERGENCY MEDICINE

## 2023-03-18 RX ORDER — CEPHALEXIN 500 MG/1
500 CAPSULE ORAL ONCE
Status: COMPLETED | OUTPATIENT
Start: 2023-03-18 | End: 2023-03-18

## 2023-03-18 RX ORDER — CEPHALEXIN 500 MG/1
500 CAPSULE ORAL 4 TIMES DAILY
Qty: 28 CAPSULE | Refills: 0 | Status: ACTIVE | OUTPATIENT
Start: 2023-03-18 | End: 2023-03-25

## 2023-03-18 RX ADMIN — CEPHALEXIN 500 MG: 500 CAPSULE ORAL at 03:47

## 2023-03-18 ASSESSMENT — PAIN DESCRIPTION - PAIN TYPE: TYPE: ACUTE PAIN

## 2023-03-18 ASSESSMENT — FIBROSIS 4 INDEX: FIB4 SCORE: 1.8

## 2023-03-18 NOTE — ED NOTES
Given discharge instructions for indwelling lucio catheter care at home  Changed the urometer with the leg urine collection bag  Pt verbalized understanding of the instructions

## 2023-03-18 NOTE — ED NOTES
Pt discharged to home. Discharge paperwork provided. Education provided by ERP.  Pt was given follow up instructions and prescription   Pt verbalized understanding of all instructions for discharge. Patient ambulatory, alert and oriented x 4, out of ER with all belongings and steady gait.

## 2023-03-18 NOTE — DISCHARGE INSTRUCTIONS
You were found to have acute urinary retention.  A Mc catheter was again placed.  Please continue to use this until you follow-up with your urologist.    You did have an elevated blood pressure reading emergency department.  This should be followed up with your primary care provider.    Your urinalysis was somewhat concerning for an infection.  You are being started on antibiotics.  Please discuss this with your urologist.    Please return to the emergency department seek medical attention if you develop:  Blockage of urine, flank pain, fevers, any other new or concerning findings

## 2023-03-18 NOTE — ED PROVIDER NOTES
"ED Provider Note        CHIEF COMPLAINT  Chief Complaint   Patient presents with    Difficulty Urinating         [facility]    Salvador Ramos is a 67 y.o. male who presents to the Emergency Department with inability to pass urine.  The patient recently had urinary retention causing abdominal pain, was seen in the emergency department, Mc catheter was placed, and the Mc was recently removed.  He has been unable to pass urine for the past day causing increasing lower abdominal pain.  Denies any flank pain, fevers, vomiting.  He reports that this is similar to his prior presentation for urinary retention.    REVIEW OF SYSTEMS  See [facility] for further details. All other systems are negative.     PAST MEDICAL HISTORY     Past Medical History:   Diagnosis Date    Prostate hypertrophy     Urinary retention with incomplete bladder emptying        SURGICAL HISTORY  History reviewed. No pertinent surgical history.    FAMILY HISTORY  History reviewed. No pertinent family history.    SOCIAL HISTORY    reports that he has never smoked. He does not have any smokeless tobacco history on file. He reports that he does not drink alcohol and does not use drugs.    CURRENT MEDICATIONS  Home Medications       Reviewed by Anabel Damon R.N. (Registered Nurse) on 03/18/23 at 0201  Med List Status: Not Addressed     Medication Last Dose Status   bacitracin 500 UNIT/GM Ointment  Active   hydrocodone-acetaminophen (NORCO) 5-325 MG Tab per tablet  Active   tamsulosin (FLOMAX) 0.4 MG capsule  Active                    ALLERGIES  No Known Allergies    PHYSICAL EXAM  VITAL SIGNS: /66   Pulse 70   Temp 36.3 °C (97.3 °F) (Temporal)   Resp 17   Ht 1.676 m (5' 6\")   Wt 78 kg (171 lb 15.3 oz)   SpO2 94%   BMI 27.75 kg/m²   Gen: Alert, uncomfortable appearing  HEENT: ATNC  Eyes: PERRL, EOMI, normal conjunctiva  Neck: trachea midline  Resp: no respiratory distress  CV: No JVD, regular rate and rhythm  Abd: non-distended, soft, lower " abdominal tenderness  : Normal external genitalia  Ext: No deformities  Psych: normal mood  Neuro: speech fluent    DIAGNOSTIC STUDIES / PROCEDURES    LABS  Labs Reviewed   URINALYSIS - Abnormal; Notable for the following components:       Result Value    Nitrite Positive (*)     Leukocyte Esterase Small (*)     Occult Blood Large (*)     All other components within normal limits   URINE MICROSCOPIC (W/UA) - Abnormal; Notable for the following components:    WBC 10-20 (*)     RBC >150 (*)     Bacteria Moderate (*)     All other components within normal limits   URINE CULTURE-EXISTING-LESS THAN 48 HOURS     Review of labs from 2/25/2013 demonstrates grossly normal CBC, slightly elevated glucose but otherwise normal CMP, hematuria    COURSE & MEDICAL DECISION MAKING  Pertinent Labs & Imaging studies were reviewed. (See chart for details)    EXTERNAL RECORDS REVIEWED  Outpatient Notes patient seen 3/16/2023 for urinary retention, history of BPH.    INITIAL ASSESSMENT AND PLAN  Care Narrative: Patient presents with recurrence of acute urinary retention.  No neurologic symptoms to suggest cauda equina syndrome or other neurologic etiology.  No indication for imaging at this time.  Given the duration of symptoms, low suspicion that it would be able to cause an FRANCISCO J, or lab abnormalities.  No indication for labs at this time.  Mc was placed, patient was given leg bag, referred back to his urologist for follow-up.    Urinalysis concerning for possible infection, will treat with cephalexin, sent for culture.    ADDITIONAL PROBLEM LIST AND DISPOSITION  1.  Acute urinary retention: Follow-up with urology, BPH is primary cause most likely.  Mc placed in the emergency department  2.  Hematuria, likely secondary to above  3.  Elevated blood pressure reading: Referred to primary care for follow-up  4.  Abnormal urinalysis: This is concerning for infection.  Will treat with antibiotics, sent for culture    Escalation of care  considered, and ultimately not performed: blood analysis and diagnostic imaging.     Barriers to care at this time, including but not limited to: Patient does not have established PCP.       HTN/IDDM FOLLOW UP:  The patient is referred to a primary physician for blood pressure management, diabetic screening, and for all other preventive health concerns     The patient was given return precautions, anticipatory guidance, and the opportunity to ask questions prior to discharge.         FINAL IMPRESSION  1. Urinary retention    2. Abnormal urinalysis           DISPOSITION:  Patient will be discharged home in stable condition.    FOLLOW UP:  [facility] [place] [address] [place] [address]  [phone]    Schedule an appointment as soon as possible for a visit       [facility], Emergency Dept  [address] [address] [phone]    If symptoms worsen    Your regular doctor.  To establish a primary care provider within our system, please call [phone]            OUTPATIENT MEDICATIONS:  New Prescriptions    CEPHALEXIN (KEFLEX) 500 MG CAP    Take 1 Capsule by mouth 4 times a day for 7 days.

## 2023-03-18 NOTE — ED TRIAGE NOTES
Salvador Ramos  67 y.o. male    Chief Complaint   Patient presents with    Difficulty Urinating     Pt arrives with complaints of inability to urinate for past day. Pt states urine has been dribbling today. Pt in severe pain. Had indwelling catheter removed on 3/16.     Vitals:    03/18/23 0138   BP: (!) 168/94   Pulse:    Resp:    Temp:    SpO2:        Triage process explained to patient, apologized for wait time, and returned to lobby.  Pt informed to notify staff of any change in condition.

## 2023-03-20 LAB
BACTERIA UR CULT: ABNORMAL
BACTERIA UR CULT: ABNORMAL
SIGNIFICANT IND 70042: ABNORMAL
SITE SITE: ABNORMAL
SOURCE SOURCE: ABNORMAL

## 2023-03-23 ENCOUNTER — TELEPHONE (OUTPATIENT)
Dept: HEALTH INFORMATION MANAGEMENT | Facility: OTHER | Age: 68
End: 2023-03-23
Payer: COMMERCIAL

## 2023-03-24 NOTE — TELEPHONE ENCOUNTER
Patient called ED in attempt to return my call by hitting fe. I was advised and attempted to return his call with no luck of getting into contact. I left another  for scheduling with primary care. If patient calls, please transfer to [phone] or schedule from referral for primary care (Establishing w/ [facility] physician) if the patient would like to.    AG

## 2023-03-27 ENCOUNTER — OFFICE VISIT (OUTPATIENT)
Dept: MEDICAL GROUP | Facility: MEDICAL CENTER | Age: 68
End: 2023-03-27
Attending: EMERGENCY MEDICINE
Payer: COMMERCIAL

## 2023-03-27 ENCOUNTER — HOSPITAL ENCOUNTER (OUTPATIENT)
Dept: LAB | Facility: MEDICAL CENTER | Age: 68
End: 2023-03-27
Attending: STUDENT IN AN ORGANIZED HEALTH CARE EDUCATION/TRAINING PROGRAM
Payer: COMMERCIAL

## 2023-03-27 VITALS
TEMPERATURE: 97.5 F | DIASTOLIC BLOOD PRESSURE: 62 MMHG | HEART RATE: 82 BPM | BODY MASS INDEX: 27.32 KG/M2 | SYSTOLIC BLOOD PRESSURE: 128 MMHG | WEIGHT: 170 LBS | HEIGHT: 66 IN | OXYGEN SATURATION: 98 %

## 2023-03-27 DIAGNOSIS — R33.9 URINARY RETENTION: ICD-10-CM

## 2023-03-27 DIAGNOSIS — N40.1 LOWER URINARY TRACT SYMPTOMS DUE TO BENIGN PROSTATIC HYPERPLASIA: ICD-10-CM

## 2023-03-27 DIAGNOSIS — E66.3 OVERWEIGHT (BMI 25.0-29.9): ICD-10-CM

## 2023-03-27 DIAGNOSIS — Z23 NEED FOR VACCINATION: ICD-10-CM

## 2023-03-27 DIAGNOSIS — Z12.12 SCREENING FOR COLORECTAL CANCER: ICD-10-CM

## 2023-03-27 DIAGNOSIS — Z11.59 ENCOUNTER FOR HEPATITIS C SCREENING TEST FOR LOW RISK PATIENT: ICD-10-CM

## 2023-03-27 DIAGNOSIS — Z12.11 SCREENING FOR COLORECTAL CANCER: ICD-10-CM

## 2023-03-27 PROBLEM — R35.1 NOCTURIA: Status: ACTIVE | Noted: 2023-02-24

## 2023-03-27 LAB
CHOLEST SERPL-MCNC: 185 MG/DL (ref 100–199)
EST. AVERAGE GLUCOSE BLD GHB EST-MCNC: 117 MG/DL
FASTING STATUS PATIENT QL REPORTED: NORMAL
HBA1C MFR BLD: 5.7 % (ref 4–5.6)
HCV AB SER QL: NORMAL
HDLC SERPL-MCNC: 45 MG/DL
LDLC SERPL CALC-MCNC: 97 MG/DL
PSA SERPL-MCNC: 20.6 NG/ML (ref 0–4)
TESTOST SERPL-MCNC: 635 NG/DL (ref 175–781)
TRIGL SERPL-MCNC: 213 MG/DL (ref 0–149)
TSH SERPL DL<=0.005 MIU/L-ACNC: 2.99 UIU/ML (ref 0.38–5.33)

## 2023-03-27 PROCEDURE — 90471 IMMUNIZATION ADMIN: CPT | Performed by: STUDENT IN AN ORGANIZED HEALTH CARE EDUCATION/TRAINING PROGRAM

## 2023-03-27 PROCEDURE — 83036 HEMOGLOBIN GLYCOSYLATED A1C: CPT

## 2023-03-27 PROCEDURE — 90715 TDAP VACCINE 7 YRS/> IM: CPT | Performed by: STUDENT IN AN ORGANIZED HEALTH CARE EDUCATION/TRAINING PROGRAM

## 2023-03-27 PROCEDURE — 84153 ASSAY OF PSA TOTAL: CPT

## 2023-03-27 PROCEDURE — 80061 LIPID PANEL: CPT

## 2023-03-27 PROCEDURE — 84403 ASSAY OF TOTAL TESTOSTERONE: CPT

## 2023-03-27 PROCEDURE — 36415 COLL VENOUS BLD VENIPUNCTURE: CPT

## 2023-03-27 PROCEDURE — 99204 OFFICE O/P NEW MOD 45 MIN: CPT | Mod: 25 | Performed by: STUDENT IN AN ORGANIZED HEALTH CARE EDUCATION/TRAINING PROGRAM

## 2023-03-27 PROCEDURE — 86803 HEPATITIS C AB TEST: CPT

## 2023-03-27 PROCEDURE — 90472 IMMUNIZATION ADMIN EACH ADD: CPT | Performed by: STUDENT IN AN ORGANIZED HEALTH CARE EDUCATION/TRAINING PROGRAM

## 2023-03-27 PROCEDURE — 84443 ASSAY THYROID STIM HORMONE: CPT

## 2023-03-27 PROCEDURE — 90677 PCV20 VACCINE IM: CPT | Performed by: STUDENT IN AN ORGANIZED HEALTH CARE EDUCATION/TRAINING PROGRAM

## 2023-03-27 RX ORDER — TAMSULOSIN HYDROCHLORIDE 0.4 MG/1
0.4 CAPSULE ORAL DAILY
Qty: 90 CAPSULE | Refills: 3 | Status: ON HOLD | OUTPATIENT
Start: 2023-03-27 | End: 2023-05-19

## 2023-03-27 RX ORDER — FINASTERIDE 5 MG/1
5 TABLET, FILM COATED ORAL DAILY
Qty: 90 TABLET | Refills: 3 | Status: SHIPPED | OUTPATIENT
Start: 2023-03-27

## 2023-03-27 ASSESSMENT — PATIENT HEALTH QUESTIONNAIRE - PHQ9: CLINICAL INTERPRETATION OF PHQ2 SCORE: 0

## 2023-03-27 ASSESSMENT — FIBROSIS 4 INDEX: FIB4 SCORE: 1.8

## 2023-03-27 ASSESSMENT — ENCOUNTER SYMPTOMS
WHEEZING: 0
WEIGHT LOSS: 0
DIZZINESS: 0
CHILLS: 0
SHORTNESS OF BREATH: 0
FEVER: 0
HEADACHES: 0
PALPITATIONS: 0
VOMITING: 0
NAUSEA: 0

## 2023-03-27 NOTE — LETTER
March 27, 2023    To Whom It May Concern:         This is confirmation that Salvador Lindseyrez attended his scheduled appointment with Artemio Segura M.D. on 3/27/23. Patient can return to work without restriction.          If you have any questions please do not hesitate to call me at the phone number listed below.    Sincerely,          Artemio Segura M.D.  [phone]

## 2023-03-27 NOTE — PROGRESS NOTES
"Subjective:     CC:  Diagnoses of Lower urinary tract symptoms due to benign prostatic hyperplasia, Urinary retention, Screening for colorectal cancer, Encounter for hepatitis C screening test for low risk patient, Need for vaccination, and Overweight (BMI 25.0-29.9) were pertinent to this visit.    HISTORY OF THE PRESENT ILLNESS: Patient is a 67 y.o. male. This pleasant patient is here today to establish care and discuss     Problem   Overweight (Bmi 25.0-29.9)   Lower Urinary Tract Symptoms Due to Benign Prostatic Hyperplasia    This is chronic issue.  Prior PSA uptrending.  Patient report prior to initial ED visit 02/2023, patient has had nocturia 3-4 times per night.  Initially seen in ED 2/23/2023- discharged with Mc is seen again 3/18/2023 with abdominal pain found to have urinary retention.  He reports his Mc was removed 1 day prior  For next follow-up appointment with [facility] is mid-April       Nocturia   Retention of Urine         Health Maintenance:     ROS:   Review of Systems   Constitutional:  Negative for chills, fever and weight loss.   HENT:  Negative for hearing loss.    Respiratory:  Negative for shortness of breath and wheezing.    Cardiovascular:  Negative for chest pain and palpitations.   Gastrointestinal:  Negative for nausea and vomiting.   Genitourinary:  Negative for frequency and urgency.   Skin:  Negative for rash.   Neurological:  Negative for dizziness and headaches.       Objective:       Exam: /62 (BP Location: Left arm, Patient Position: Sitting, BP Cuff Size: Adult)   Pulse 82   Temp 36.4 °C (97.5 °F) (Temporal)   Ht 1.676 m (5' 6\")   Wt 77.1 kg (170 lb)   SpO2 98%  Body mass index is 27.44 kg/m².    Physical Exam  Constitutional:       Appearance: Normal appearance.   Cardiovascular:      Rate and Rhythm: Normal rate and regular rhythm.      Heart sounds: No murmur heard.  Pulmonary:      Effort: Pulmonary effort is normal.      Breath sounds: Normal breath " sounds. No wheezing.   Abdominal:      General: There is no distension.      Palpations: Abdomen is soft.   Musculoskeletal:      Cervical back: Normal range of motion and neck supple.   Lymphadenopathy:      Cervical: No cervical adenopathy.   Neurological:      Mental Status: He is alert.         Labs: 2/2023 cbc was fine, Cmp renal function stable,  slight hyponatremia, PSA 7.97 5 years ago    Assessment & Plan:   67 y.o. male with the following -    1. Lower urinary tract symptoms due to benign prostatic hyperplasia  2. Urinary retention  Chronic, uncontrolled  History of BPH symptoms ongoing with 2 recent ED visit for urinary retention status post Mc catheter.  Patient is following with [facility] next appointment mid April  Currently on tamsulosin 0.4 mg daily  Plan  Recheck PSA, start finasteride  May consider ordering MR prostate depending on PSA level  - PROSTATE SPECIFIC AG SCREENING; Future  - finasteride (PROSCAR) 5 MG Tab; Take 1 Tablet by mouth every day.  Dispense: 90 Tablet; Refill: 3  - TESTOSTERONE SERUM; Future      3. Screening for colorectal cancer    - COLOGUARD (FIT DNA)    4. Encounter for hepatitis C screening test for low risk patient    - HEP C VIRUS ANTIBODY; Future    5. Need for vaccination    - Pneumococcal Conjugate Vaccine 20-Valent (19 yrs+)  - Tdap =>8yo IM    6. Overweight (BMI 25.0-29.9)  Chronic, stable  Obtain lab   - Lipid Profile; Future  - TSH WITH REFLEX TO FT4; Future  - HEMOGLOBIN A1C; Future            Return in about 3 months (around 6/27/2023) for prostate / labs.    Please note that this dictation was created using voice recognition software. I have made every reasonable attempt to correct obvious errors, but I expect that there are errors of grammar and possibly content that I did not discover before finalizing the note.

## 2023-03-28 ENCOUNTER — TELEPHONE (OUTPATIENT)
Dept: MEDICAL GROUP | Facility: MEDICAL CENTER | Age: 68
End: 2023-03-28
Payer: COMMERCIAL

## 2023-04-21 ENCOUNTER — APPOINTMENT (OUTPATIENT)
Dept: ADMISSIONS | Facility: MEDICAL CENTER | Age: 68
End: 2023-04-21
Attending: UROLOGY
Payer: COMMERCIAL

## 2023-05-02 ENCOUNTER — PRE-ADMISSION TESTING (OUTPATIENT)
Dept: ADMISSIONS | Facility: MEDICAL CENTER | Age: 68
End: 2023-05-02
Attending: UROLOGY
Payer: COMMERCIAL

## 2023-05-03 ENCOUNTER — PRE-ADMISSION TESTING (OUTPATIENT)
Dept: ADMISSIONS | Facility: MEDICAL CENTER | Age: 68
End: 2023-05-03
Attending: UROLOGY
Payer: COMMERCIAL

## 2023-05-03 ENCOUNTER — HOSPITAL ENCOUNTER (OUTPATIENT)
Facility: MEDICAL CENTER | Age: 68
End: 2023-05-03
Attending: UROLOGY
Payer: COMMERCIAL

## 2023-05-03 ENCOUNTER — APPOINTMENT (OUTPATIENT)
Dept: ADMISSIONS | Facility: MEDICAL CENTER | Age: 68
End: 2023-05-03
Payer: COMMERCIAL

## 2023-05-03 DIAGNOSIS — Z01.810 PRE-OPERATIVE CARDIOVASCULAR EXAMINATION: ICD-10-CM

## 2023-05-03 DIAGNOSIS — Z01.812 PRE-OPERATIVE LABORATORY EXAMINATION: ICD-10-CM

## 2023-05-03 LAB
ANION GAP SERPL CALC-SCNC: 9 MMOL/L (ref 7–16)
BASOPHILS # BLD AUTO: 0.6 % (ref 0–1.8)
BASOPHILS # BLD: 0.03 K/UL (ref 0–0.12)
BUN SERPL-MCNC: 14 MG/DL (ref 8–22)
CALCIUM SERPL-MCNC: 8.4 MG/DL (ref 8.5–10.5)
CHLORIDE SERPL-SCNC: 101 MMOL/L (ref 96–112)
CO2 SERPL-SCNC: 25 MMOL/L (ref 20–33)
CREAT SERPL-MCNC: 0.81 MG/DL (ref 0.5–1.4)
EKG IMPRESSION: NORMAL
EOSINOPHIL # BLD AUTO: 0.13 K/UL (ref 0–0.51)
EOSINOPHIL NFR BLD: 2.4 % (ref 0–6.9)
ERYTHROCYTE [DISTWIDTH] IN BLOOD BY AUTOMATED COUNT: 42.5 FL (ref 35.9–50)
GFR SERPLBLD CREATININE-BSD FMLA CKD-EPI: 95 ML/MIN/1.73 M 2
GLUCOSE SERPL-MCNC: 107 MG/DL (ref 65–99)
HCT VFR BLD AUTO: 50.8 % (ref 42–52)
HGB BLD-MCNC: 17 G/DL (ref 14–18)
IMM GRANULOCYTES # BLD AUTO: 0.01 K/UL (ref 0–0.11)
IMM GRANULOCYTES NFR BLD AUTO: 0.2 % (ref 0–0.9)
INR PPP: 1.11 (ref 0.87–1.13)
LYMPHOCYTES # BLD AUTO: 1.14 K/UL (ref 1–4.8)
LYMPHOCYTES NFR BLD: 21.1 % (ref 22–41)
MCH RBC QN AUTO: 28.7 PG (ref 27–33)
MCHC RBC AUTO-ENTMCNC: 33.5 G/DL (ref 33.7–35.3)
MCV RBC AUTO: 85.7 FL (ref 81.4–97.8)
MONOCYTES # BLD AUTO: 0.55 K/UL (ref 0–0.85)
MONOCYTES NFR BLD AUTO: 10.2 % (ref 0–13.4)
NEUTROPHILS # BLD AUTO: 3.55 K/UL (ref 1.82–7.42)
NEUTROPHILS NFR BLD: 65.5 % (ref 44–72)
NRBC # BLD AUTO: 0 K/UL
NRBC BLD-RTO: 0 /100 WBC
PLATELET # BLD AUTO: 193 K/UL (ref 164–446)
PMV BLD AUTO: 10.2 FL (ref 9–12.9)
POTASSIUM SERPL-SCNC: 3.6 MMOL/L (ref 3.6–5.5)
PROTHROMBIN TIME: 14.2 SEC (ref 12–14.6)
RBC # BLD AUTO: 5.93 M/UL (ref 4.7–6.1)
SODIUM SERPL-SCNC: 135 MMOL/L (ref 135–145)
WBC # BLD AUTO: 5.4 K/UL (ref 4.8–10.8)

## 2023-05-03 PROCEDURE — 87077 CULTURE AEROBIC IDENTIFY: CPT | Mod: 91

## 2023-05-03 PROCEDURE — 80048 BASIC METABOLIC PNL TOTAL CA: CPT

## 2023-05-03 PROCEDURE — 85610 PROTHROMBIN TIME: CPT

## 2023-05-03 PROCEDURE — 93005 ELECTROCARDIOGRAM TRACING: CPT

## 2023-05-03 PROCEDURE — 93010 ELECTROCARDIOGRAM REPORT: CPT | Performed by: INTERNAL MEDICINE

## 2023-05-03 PROCEDURE — 87086 URINE CULTURE/COLONY COUNT: CPT

## 2023-05-03 PROCEDURE — 36415 COLL VENOUS BLD VENIPUNCTURE: CPT

## 2023-05-03 PROCEDURE — 85025 COMPLETE CBC W/AUTO DIFF WBC: CPT

## 2023-05-03 PROCEDURE — 81001 URINALYSIS AUTO W/SCOPE: CPT

## 2023-05-03 NOTE — OR NURSING
JESSE Burgess Surgery Scheduler at Dr Beckwith office.  Salvador has a lucio cath and will need to do labs in Dr Beckwith's office.  I have given the patient the phone number to call for an appointment.

## 2023-05-04 LAB
AMORPH CRY #/AREA URNS HPF: PRESENT /HPF
APPEARANCE UR: ABNORMAL
BACTERIA #/AREA URNS HPF: ABNORMAL /HPF
BILIRUB UR QL STRIP.AUTO: NEGATIVE
COLOR UR: YELLOW
EPI CELLS #/AREA URNS HPF: ABNORMAL /HPF
GLUCOSE UR STRIP.AUTO-MCNC: NEGATIVE MG/DL
HYALINE CASTS #/AREA URNS LPF: ABNORMAL /LPF
KETONES UR STRIP.AUTO-MCNC: ABNORMAL MG/DL
LEUKOCYTE ESTERASE UR QL STRIP.AUTO: ABNORMAL
MICRO URNS: ABNORMAL
NITRITE UR QL STRIP.AUTO: NEGATIVE
PH UR STRIP.AUTO: 6.5 [PH] (ref 5–8)
PROT UR QL STRIP: 30 MG/DL
RBC # URNS HPF: >150 /HPF
RBC UR QL AUTO: ABNORMAL
SP GR UR STRIP.AUTO: 1.02
UROBILINOGEN UR STRIP.AUTO-MCNC: 0.2 MG/DL
WBC #/AREA URNS HPF: ABNORMAL /HPF

## 2023-05-17 ENCOUNTER — ANESTHESIA EVENT (OUTPATIENT)
Dept: SURGERY | Facility: MEDICAL CENTER | Age: 68
End: 2023-05-17
Payer: COMMERCIAL

## 2023-05-18 ENCOUNTER — HOSPITAL ENCOUNTER (OUTPATIENT)
Facility: MEDICAL CENTER | Age: 68
End: 2023-05-20
Attending: UROLOGY | Admitting: UROLOGY
Payer: COMMERCIAL

## 2023-05-18 ENCOUNTER — ANESTHESIA (OUTPATIENT)
Dept: SURGERY | Facility: MEDICAL CENTER | Age: 68
End: 2023-05-18
Payer: COMMERCIAL

## 2023-05-18 DIAGNOSIS — G89.18 POST-OP PAIN: ICD-10-CM

## 2023-05-18 DIAGNOSIS — N13.8 BPH WITH OBSTRUCTION/LOWER URINARY TRACT SYMPTOMS: ICD-10-CM

## 2023-05-18 DIAGNOSIS — N40.1 BPH WITH OBSTRUCTION/LOWER URINARY TRACT SYMPTOMS: ICD-10-CM

## 2023-05-18 LAB — PATHOLOGY CONSULT NOTE: NORMAL

## 2023-05-18 PROCEDURE — 700102 HCHG RX REV CODE 250 W/ 637 OVERRIDE(OP): Performed by: STUDENT IN AN ORGANIZED HEALTH CARE EDUCATION/TRAINING PROGRAM

## 2023-05-18 PROCEDURE — 700111 HCHG RX REV CODE 636 W/ 250 OVERRIDE (IP): Performed by: STUDENT IN AN ORGANIZED HEALTH CARE EDUCATION/TRAINING PROGRAM

## 2023-05-18 PROCEDURE — 160048 HCHG OR STATISTICAL LEVEL 1-5: Performed by: UROLOGY

## 2023-05-18 PROCEDURE — 110371 HCHG SHELL REV 272: Performed by: UROLOGY

## 2023-05-18 PROCEDURE — 160002 HCHG RECOVERY MINUTES (STAT): Performed by: UROLOGY

## 2023-05-18 PROCEDURE — 160035 HCHG PACU - 1ST 60 MINS PHASE I: Performed by: UROLOGY

## 2023-05-18 PROCEDURE — 700101 HCHG RX REV CODE 250: Performed by: STUDENT IN AN ORGANIZED HEALTH CARE EDUCATION/TRAINING PROGRAM

## 2023-05-18 PROCEDURE — G0378 HOSPITAL OBSERVATION PER HR: HCPCS

## 2023-05-18 PROCEDURE — 700102 HCHG RX REV CODE 250 W/ 637 OVERRIDE(OP): Performed by: PHYSICIAN ASSISTANT

## 2023-05-18 PROCEDURE — 700105 HCHG RX REV CODE 258: Performed by: UROLOGY

## 2023-05-18 PROCEDURE — 700101 HCHG RX REV CODE 250: Performed by: UROLOGY

## 2023-05-18 PROCEDURE — 00914 ANES TRURL PX RESCJ PRST8: CPT | Performed by: STUDENT IN AN ORGANIZED HEALTH CARE EDUCATION/TRAINING PROGRAM

## 2023-05-18 PROCEDURE — 160042 HCHG SURGERY MINUTES - EA ADDL 1 MIN LEVEL 5: Performed by: UROLOGY

## 2023-05-18 PROCEDURE — A9270 NON-COVERED ITEM OR SERVICE: HCPCS | Performed by: PHYSICIAN ASSISTANT

## 2023-05-18 PROCEDURE — A9270 NON-COVERED ITEM OR SERVICE: HCPCS | Performed by: STUDENT IN AN ORGANIZED HEALTH CARE EDUCATION/TRAINING PROGRAM

## 2023-05-18 PROCEDURE — 700101 HCHG RX REV CODE 250: Performed by: PHYSICIAN ASSISTANT

## 2023-05-18 PROCEDURE — 160009 HCHG ANES TIME/MIN: Performed by: UROLOGY

## 2023-05-18 PROCEDURE — 88307 TISSUE EXAM BY PATHOLOGIST: CPT

## 2023-05-18 PROCEDURE — 502714 HCHG ROBOTIC SURGERY SERVICES: Performed by: UROLOGY

## 2023-05-18 PROCEDURE — 160031 HCHG SURGERY MINUTES - 1ST 30 MINS LEVEL 5: Performed by: UROLOGY

## 2023-05-18 RX ORDER — HYDROMORPHONE HYDROCHLORIDE 1 MG/ML
0.2 INJECTION, SOLUTION INTRAMUSCULAR; INTRAVENOUS; SUBCUTANEOUS
Status: DISCONTINUED | OUTPATIENT
Start: 2023-05-18 | End: 2023-05-18 | Stop reason: HOSPADM

## 2023-05-18 RX ORDER — HALOPERIDOL 5 MG/ML
1 INJECTION INTRAMUSCULAR
Status: DISCONTINUED | OUTPATIENT
Start: 2023-05-18 | End: 2023-05-18 | Stop reason: HOSPADM

## 2023-05-18 RX ORDER — OXYBUTYNIN CHLORIDE 10 MG/1
10 TABLET, EXTENDED RELEASE ORAL DAILY
Qty: 7 TABLET | Refills: 0 | Status: SHIPPED | OUTPATIENT
Start: 2023-05-18 | End: 2023-05-25

## 2023-05-18 RX ORDER — HYDROMORPHONE HYDROCHLORIDE 2 MG/ML
INJECTION, SOLUTION INTRAMUSCULAR; INTRAVENOUS; SUBCUTANEOUS PRN
Status: DISCONTINUED | OUTPATIENT
Start: 2023-05-18 | End: 2023-05-18 | Stop reason: SURG

## 2023-05-18 RX ORDER — OXYCODONE HCL 5 MG/5 ML
5 SOLUTION, ORAL ORAL
Status: DISCONTINUED | OUTPATIENT
Start: 2023-05-18 | End: 2023-05-18 | Stop reason: HOSPADM

## 2023-05-18 RX ORDER — DEXAMETHASONE SODIUM PHOSPHATE 4 MG/ML
INJECTION, SOLUTION INTRA-ARTICULAR; INTRALESIONAL; INTRAMUSCULAR; INTRAVENOUS; SOFT TISSUE PRN
Status: DISCONTINUED | OUTPATIENT
Start: 2023-05-18 | End: 2023-05-18 | Stop reason: SURG

## 2023-05-18 RX ORDER — OXYCODONE HYDROCHLORIDE 5 MG/1
5 TABLET ORAL
Status: DISCONTINUED | OUTPATIENT
Start: 2023-05-18 | End: 2023-05-20 | Stop reason: HOSPADM

## 2023-05-18 RX ORDER — ACETAMINOPHEN 500 MG
1000 TABLET ORAL EVERY 6 HOURS
Status: DISCONTINUED | OUTPATIENT
Start: 2023-05-18 | End: 2023-05-20 | Stop reason: HOSPADM

## 2023-05-18 RX ORDER — HYDROMORPHONE HYDROCHLORIDE 1 MG/ML
0.1 INJECTION, SOLUTION INTRAMUSCULAR; INTRAVENOUS; SUBCUTANEOUS
Status: DISCONTINUED | OUTPATIENT
Start: 2023-05-18 | End: 2023-05-18 | Stop reason: HOSPADM

## 2023-05-18 RX ORDER — MIDAZOLAM HYDROCHLORIDE 1 MG/ML
INJECTION INTRAMUSCULAR; INTRAVENOUS PRN
Status: DISCONTINUED | OUTPATIENT
Start: 2023-05-18 | End: 2023-05-18 | Stop reason: SURG

## 2023-05-18 RX ORDER — ONDANSETRON 2 MG/ML
4 INJECTION INTRAMUSCULAR; INTRAVENOUS
Status: DISCONTINUED | OUTPATIENT
Start: 2023-05-18 | End: 2023-05-18 | Stop reason: HOSPADM

## 2023-05-18 RX ORDER — DOXYCYCLINE HYCLATE 100 MG
100 TABLET ORAL 2 TIMES DAILY
COMMUNITY

## 2023-05-18 RX ORDER — HYDROMORPHONE HYDROCHLORIDE 1 MG/ML
0.4 INJECTION, SOLUTION INTRAMUSCULAR; INTRAVENOUS; SUBCUTANEOUS
Status: DISCONTINUED | OUTPATIENT
Start: 2023-05-18 | End: 2023-05-18 | Stop reason: HOSPADM

## 2023-05-18 RX ORDER — ONDANSETRON 2 MG/ML
INJECTION INTRAMUSCULAR; INTRAVENOUS PRN
Status: DISCONTINUED | OUTPATIENT
Start: 2023-05-18 | End: 2023-05-18 | Stop reason: SURG

## 2023-05-18 RX ORDER — OXYCODONE HYDROCHLORIDE 5 MG/1
5 TABLET ORAL EVERY 4 HOURS PRN
Qty: 20 TABLET | Refills: 0 | Status: SHIPPED | OUTPATIENT
Start: 2023-05-18 | End: 2023-05-25

## 2023-05-18 RX ORDER — ACETAMINOPHEN 325 MG/1
TABLET ORAL PRN
Status: DISCONTINUED | OUTPATIENT
Start: 2023-05-18 | End: 2023-05-18 | Stop reason: SURG

## 2023-05-18 RX ORDER — ROCURONIUM BROMIDE 10 MG/ML
INJECTION, SOLUTION INTRAVENOUS PRN
Status: DISCONTINUED | OUTPATIENT
Start: 2023-05-18 | End: 2023-05-18 | Stop reason: SURG

## 2023-05-18 RX ORDER — OXYCODONE HYDROCHLORIDE 10 MG/1
10 TABLET ORAL
Status: DISCONTINUED | OUTPATIENT
Start: 2023-05-18 | End: 2023-05-20 | Stop reason: HOSPADM

## 2023-05-18 RX ORDER — LIDOCAINE HYDROCHLORIDE 20 MG/ML
INJECTION, SOLUTION EPIDURAL; INFILTRATION; INTRACAUDAL; PERINEURAL PRN
Status: DISCONTINUED | OUTPATIENT
Start: 2023-05-18 | End: 2023-05-18 | Stop reason: SURG

## 2023-05-18 RX ORDER — ACETAMINOPHEN 500 MG
1000 TABLET ORAL EVERY 6 HOURS PRN
Status: DISCONTINUED | OUTPATIENT
Start: 2023-05-23 | End: 2023-05-20 | Stop reason: HOSPADM

## 2023-05-18 RX ORDER — DEXTROSE MONOHYDRATE, SODIUM CHLORIDE, AND POTASSIUM CHLORIDE 50; 1.49; 4.5 G/1000ML; G/1000ML; G/1000ML
INJECTION, SOLUTION INTRAVENOUS CONTINUOUS
Status: DISPENSED | OUTPATIENT
Start: 2023-05-18 | End: 2023-05-18

## 2023-05-18 RX ORDER — BUPIVACAINE HYDROCHLORIDE AND EPINEPHRINE 5; 5 MG/ML; UG/ML
INJECTION, SOLUTION EPIDURAL; INTRACAUDAL; PERINEURAL
Status: DISCONTINUED | OUTPATIENT
Start: 2023-05-18 | End: 2023-05-18 | Stop reason: HOSPADM

## 2023-05-18 RX ORDER — DOCUSATE SODIUM 100 MG/1
100 CAPSULE, LIQUID FILLED ORAL 2 TIMES DAILY
Qty: 10 CAPSULE | Refills: 0 | Status: SHIPPED | OUTPATIENT
Start: 2023-05-18

## 2023-05-18 RX ORDER — HYDROMORPHONE HYDROCHLORIDE 1 MG/ML
0.5 INJECTION, SOLUTION INTRAMUSCULAR; INTRAVENOUS; SUBCUTANEOUS
Status: DISCONTINUED | OUTPATIENT
Start: 2023-05-18 | End: 2023-05-20 | Stop reason: HOSPADM

## 2023-05-18 RX ORDER — KETOROLAC TROMETHAMINE 30 MG/ML
INJECTION, SOLUTION INTRAMUSCULAR; INTRAVENOUS PRN
Status: DISCONTINUED | OUTPATIENT
Start: 2023-05-18 | End: 2023-05-18 | Stop reason: SURG

## 2023-05-18 RX ORDER — ACETAMINOPHEN 500 MG
TABLET ORAL
Status: COMPLETED
Start: 2023-05-18 | End: 2023-05-18

## 2023-05-18 RX ORDER — FINASTERIDE 5 MG/1
5 TABLET, FILM COATED ORAL DAILY
Status: DISCONTINUED | OUTPATIENT
Start: 2023-05-19 | End: 2023-05-20 | Stop reason: HOSPADM

## 2023-05-18 RX ORDER — ONDANSETRON 2 MG/ML
4 INJECTION INTRAMUSCULAR; INTRAVENOUS EVERY 4 HOURS PRN
Status: DISCONTINUED | OUTPATIENT
Start: 2023-05-18 | End: 2023-05-20 | Stop reason: HOSPADM

## 2023-05-18 RX ORDER — DIPHENHYDRAMINE HYDROCHLORIDE 50 MG/ML
25 INJECTION INTRAMUSCULAR; INTRAVENOUS EVERY 6 HOURS PRN
Status: DISCONTINUED | OUTPATIENT
Start: 2023-05-18 | End: 2023-05-20 | Stop reason: HOSPADM

## 2023-05-18 RX ORDER — OXYCODONE HCL 5 MG/5 ML
10 SOLUTION, ORAL ORAL
Status: DISCONTINUED | OUTPATIENT
Start: 2023-05-18 | End: 2023-05-18 | Stop reason: HOSPADM

## 2023-05-18 RX ORDER — SODIUM CHLORIDE, SODIUM LACTATE, POTASSIUM CHLORIDE, CALCIUM CHLORIDE 600; 310; 30; 20 MG/100ML; MG/100ML; MG/100ML; MG/100ML
INJECTION, SOLUTION INTRAVENOUS CONTINUOUS
Status: DISCONTINUED | OUTPATIENT
Start: 2023-05-18 | End: 2023-05-18

## 2023-05-18 RX ORDER — CEFAZOLIN SODIUM 1 G/3ML
INJECTION, POWDER, FOR SOLUTION INTRAMUSCULAR; INTRAVENOUS PRN
Status: DISCONTINUED | OUTPATIENT
Start: 2023-05-18 | End: 2023-05-18 | Stop reason: SURG

## 2023-05-18 RX ORDER — DOCUSATE SODIUM 100 MG/1
100 CAPSULE, LIQUID FILLED ORAL 2 TIMES DAILY
Status: DISCONTINUED | OUTPATIENT
Start: 2023-05-18 | End: 2023-05-20 | Stop reason: HOSPADM

## 2023-05-18 RX ADMIN — HYDROMORPHONE HYDROCHLORIDE 0.2 MG: 2 INJECTION INTRAMUSCULAR; INTRAVENOUS; SUBCUTANEOUS at 10:23

## 2023-05-18 RX ADMIN — HYDROMORPHONE HYDROCHLORIDE 0.4 MG: 2 INJECTION INTRAMUSCULAR; INTRAVENOUS; SUBCUTANEOUS at 08:28

## 2023-05-18 RX ADMIN — KETOROLAC TROMETHAMINE 15 MG: 30 INJECTION, SOLUTION INTRAMUSCULAR; INTRAVENOUS at 10:17

## 2023-05-18 RX ADMIN — Medication 1 APPLICATOR: at 17:38

## 2023-05-18 RX ADMIN — FENTANYL CITRATE 50 MCG: 50 INJECTION, SOLUTION INTRAMUSCULAR; INTRAVENOUS at 08:50

## 2023-05-18 RX ADMIN — POTASSIUM CHLORIDE, DEXTROSE MONOHYDRATE AND SODIUM CHLORIDE: 150; 5; 450 INJECTION, SOLUTION INTRAVENOUS at 13:26

## 2023-05-18 RX ADMIN — FENTANYL CITRATE 50 MCG: 50 INJECTION, SOLUTION INTRAMUSCULAR; INTRAVENOUS at 07:39

## 2023-05-18 RX ADMIN — SUGAMMADEX 200 MG: 100 INJECTION, SOLUTION INTRAVENOUS at 10:27

## 2023-05-18 RX ADMIN — DEXAMETHASONE SODIUM PHOSPHATE 8 MG: 4 INJECTION INTRA-ARTICULAR; INTRALESIONAL; INTRAMUSCULAR; INTRAVENOUS; SOFT TISSUE at 07:51

## 2023-05-18 RX ADMIN — ONDANSETRON 4 MG: 2 INJECTION INTRAMUSCULAR; INTRAVENOUS at 10:02

## 2023-05-18 RX ADMIN — ACETAMINOPHEN 1000 MG: 500 TABLET, FILM COATED ORAL at 14:43

## 2023-05-18 RX ADMIN — CEFAZOLIN 2 G: 1 INJECTION, POWDER, FOR SOLUTION INTRAMUSCULAR; INTRAVENOUS at 07:39

## 2023-05-18 RX ADMIN — LIDOCAINE HYDROCHLORIDE 0.5 ML: 10 INJECTION, SOLUTION EPIDURAL; INFILTRATION; INTRACAUDAL; PERINEURAL at 05:57

## 2023-05-18 RX ADMIN — MIDAZOLAM 2 MG: 1 INJECTION, SOLUTION INTRAMUSCULAR; INTRAVENOUS at 07:37

## 2023-05-18 RX ADMIN — ROCURONIUM BROMIDE 30 MG: 50 INJECTION, SOLUTION INTRAVENOUS at 08:28

## 2023-05-18 RX ADMIN — HYDROMORPHONE HYDROCHLORIDE 0.2 MG: 2 INJECTION INTRAMUSCULAR; INTRAVENOUS; SUBCUTANEOUS at 10:08

## 2023-05-18 RX ADMIN — ROCURONIUM BROMIDE 20 MG: 50 INJECTION, SOLUTION INTRAVENOUS at 09:03

## 2023-05-18 RX ADMIN — HYDROMORPHONE HYDROCHLORIDE 0.4 MG: 2 INJECTION INTRAMUSCULAR; INTRAVENOUS; SUBCUTANEOUS at 08:00

## 2023-05-18 RX ADMIN — LIDOCAINE HYDROCHLORIDE 100 MG: 20 INJECTION, SOLUTION EPIDURAL; INFILTRATION; INTRACAUDAL at 07:39

## 2023-05-18 RX ADMIN — ACETAMINOPHEN 1000 MG: 500 TABLET, FILM COATED ORAL at 23:19

## 2023-05-18 RX ADMIN — FENTANYL CITRATE 50 MCG: 50 INJECTION, SOLUTION INTRAMUSCULAR; INTRAVENOUS at 07:53

## 2023-05-18 RX ADMIN — DOCUSATE SODIUM 100 MG: 100 CAPSULE, LIQUID FILLED ORAL at 17:38

## 2023-05-18 RX ADMIN — SODIUM CHLORIDE, POTASSIUM CHLORIDE, SODIUM LACTATE AND CALCIUM CHLORIDE: 600; 310; 30; 20 INJECTION, SOLUTION INTRAVENOUS at 05:57

## 2023-05-18 RX ADMIN — ROCURONIUM BROMIDE 70 MG: 50 INJECTION, SOLUTION INTRAVENOUS at 07:39

## 2023-05-18 RX ADMIN — PROPOFOL 110 MG: 10 INJECTION, EMULSION INTRAVENOUS at 07:39

## 2023-05-18 RX ADMIN — ACETAMINOPHEN 1000 MG: 325 TABLET ORAL at 07:22

## 2023-05-18 ASSESSMENT — FIBROSIS 4 INDEX
FIB4 SCORE: 1.99
FIB4 SCORE: 1.99

## 2023-05-18 ASSESSMENT — COGNITIVE AND FUNCTIONAL STATUS - GENERAL
DAILY ACTIVITIY SCORE: 24
SUGGESTED CMS G CODE MODIFIER DAILY ACTIVITY: CH

## 2023-05-18 NOTE — ANESTHESIA POSTPROCEDURE EVALUATION
Patient: Salvador Crews    Procedure Summary     Date: 05/18/23 Room / Location: [place] [address] / SURGERY [facility]    Anesthesia Start: 0734 Anesthesia Stop: 1038    Procedure: ROBOT ASSISTED LAPAROSCOPIC SIMPLE PROSTATECTOMY (Pelvis) Diagnosis: (RETENTION OF URINE)    Surgeons: Tin Beckwith M.D. Responsible Provider: John Mason M.D.    Anesthesia Type: general ASA Status: 2          Final Anesthesia Type: general  Last vitals  BP   Blood Pressure : 128/60    Temp   36.6 °C (97.9 °F)    Pulse   70   Resp   (!) 10    SpO2   95 %      Anesthesia Post Evaluation    Patient location during evaluation: PACU  Patient participation: complete - patient participated  Level of consciousness: awake and alert    Airway patency: patent  Anesthetic complications: no  Cardiovascular status: hemodynamically stable  Respiratory status: acceptable  Hydration status: euvolemic    PONV: none          No notable events documented.     Nurse Pain Score: 0 (NPRS)

## 2023-05-18 NOTE — OP REPORT
[facility] Operative Report  Pre-operative Diagnosis: 1. Severe BPH with bladder outlet obstruction  2. Urinary retention       Post-operative Diagnosis: Same as above   Procedure 1. Robotic Assisted Simple Prostatectomy   Attending: Tin Beckwith MD   Assistant: ASHLEY Crawford   Anesthesia: Et, General    Estimated Blood Loss: 300   IV fluids See anesthesia Lcrystalloid   Specimens: 1. Prostate adenoma     Drains: 1. 15F ruben drain in LLQ  2. 22F three way lucio with CBI and >30cc in balloon   Complications: None   Wound class II clean contaminated   Condition: Stable, procedure well tolerated    Disposition:  PACU, DC POD1 if doing well, lucio [id number].  Does not need cystogram prior to lucio removal post op.     Findings: 1. Large median lobe, Moderate bilateral lateral lobes, normal anatomy, adenoma removed     Indications for Procedure:  67-year-old male with 158 g prostate and retention with Lucio catheter.  After a full discussion of alternatives, risks, and benefits the patient consented to proceeding with  robotic assisted simple prostatectomy. Risks of procedure discussed but not limited to included injury to surrounding organs structures, infection, bleeding, air embolus, post op abscess requiring drain, incontinence, ED, possible urine leak requiring extended catheter, hernia, and the cardiovascular and pulmonary complications of surgery including MI, PE, DVT etc.     Procedure in Detail:  The patient was brought into the operating room and placed on the table supine. General anesthesia was induced, and he was placed in the lithotomy position. He was secured to the table and all pressure points protected.  Ancef was given for perioperative antibiotics. SCDs were placed. A time out was performed to confirm correct patient and procedure. He was placed in reverse trendelenberg position. After routine prepping and draping, a 22-Croatian 3way Lucio catheter was placed into the urethra.         We  started by making an 8mm vertical incision superior to the umbilicus. A Veress needle was introduced through the incision and after confirming a low initial pressure and passing the drop test, the abdomen was insufflated. A 8mm port was placed through the incision carefully into the peritoneum. The abdominal cavity was inspected and there was no evidence of any injury to the bowel or vascular structures. The additional ports were then placed as follows: an 8 mm Da Jimmy port two 8 mm lateral to the left of the camera port and one 8 mm Da Jimmy ports to the right of the camera port. A 12 mm assistant port was placed on the R side.       The Da Jimmy Xi robot was then docked.  The bladder was then distended with 200 to 300 cc of sterile saline.  The bladder was opened near the dome or just below with a vertical incision.  The fluid was drained from the bladder and the bladder limits were inspected.  The incision was extended transversely until the prostate could be adequately visualized.  This incision the lucio catheter was pulled anteriorly and the bladder incision was extended to open the bladder enough to allow good visualization of bilateral ureteral orifices and the enlarged prostate including median lobe. 2-0 stratafix sutures placed to retract lateral aspects of the bladder.     We made incision along the inferior aspect of median lobe taking care to be well away from ureteral orifices. We carried this down onto the adenoma finding the more avascular plane between prostate capsule and adenoma. This was developed posteriorly and extended circumferentially around the median and lateral lobes taking down overlying bladder as needed to extend this around.  We continued with combination of  Blunt and electrocautery to bring this all the way around anteriorly as we shelled out the adenoma. Once we began to approach apex of prostate a robotic tenaculum instrument was used to retract the prostate.  After adequately  mobilized, we came through urethra with cautery while continuing to sweep back the adenoma.  Once through the urethra the remaining posterior or lateral attachments were freed, and the adenoma was placed in a 10mm endocatch retrieval bag.  Hemostasis was then achieved by using spot monopolar and bipolar cautery on any obvious bleeders.  3-0 vicryl used to oversew both pedicles in a running fashion. Mucosa was advanced to the urethra with a 3-0 stratafix from the 4:00-8:00 position. Insufllation was reduced and there was adequate hemostasis. surgilfo was applied in the bilateral pedical regions.  At this point the 22F lucio catheter was inserted and we moved on to bladder closure. The bladder was closed with 2-0 stratafix 9inch suture on SH needle taking full-thickness bladder bites and running two sutures from each apex which were tied in the middle.  The balloon was inflated to 40cc, and the closure tested with 180 cc of saline without any significant leak.  A 15F round drain was then placed through one of the robotic parts and secured in position with permanent suture. The robot was undocked at this point.    The umbilical port site was extended and fascia divided until the specimen could be easily removed.  The midline extraction site fascia was closed with figure of 8 series of 0 vicryl. The subcutaneous tissues of the midline extraction site were reapproximated with a 2-0 running vicryl. The skin of all the larger sites were then closed with subcuticular 4-0 monocryl stitches and the small port sites were dressed with dermabond. Marcaine 0.25% was injected at all incision sites for local anesthesia. The Bora-Frederick drain was placed on bulb suction and the Lucio catheter was placed on gravity drainage with light CBI.     The patient tolerated this procedure well and awoke from anesthesia uneventfully. He was taken to the recovery room in stable condition. All needle, sponge, and instrument counts were correct  x 2.       Tin Beckwith MD.  [place] [address]  [phone]

## 2023-05-18 NOTE — OR NURSING
1035: Pt arrived from OR post simple prostatectomy under anesthesia. Pt is asleep. CBI infusing; penis is edematous and lucio catheter is to traction with gauze at tip of penis and tape on pt's leg; clear light pink output. 6 sights to abdomen; 5 with dermabond are CDI; 6th sight has gauze and tape with JOSE ARMANDO drain to bulb suction. Cardiac rhythm appears to be SR.    1125: Updated pt's wife, Le.     1218: Report to ADEEL Camacho.     1236: Pt to room via gurney with transport. Sights are CDI. CBI output is pink. RN left VM to update pt's wife.

## 2023-05-18 NOTE — DISCHARGE INSTRUCTIONS
DR. GAR'S DISCHARGE INSTRUCTINS FOLLOWING   ROBOTIC SIMPLE PROSTATECTOMY       DIET:  You can resume your regular diet. We encourage you to eat well-balanced and nutritious meals. Start slow with liquids and soups then work your way up to regular food.     ACTIVITY:  Please restrain from strenuous activity or heavy lifting (more than 10 pounds) for the next 4 week(s). You should be able to resume light work after a week. Please walk daily as much as tolerated, making exercise a part of your daily life. Do not drive while using narcotics for pain control.     WOUND CARE:  1. For the next 3 to 4 weeks, please keep the incisions clean, dry and open to air when possible.  You have absorbable sutures that do not need to be removed.  You are advised not to pick, scratch or scrub the incisions.   2. Keep the dressing over the smaller incision (where the drain had been) for 24 hours and then remove this dressing. This incision will eventually close on its own.  Keep the incision clean, dry and open to air when possible.  3. Once the drain dressing is removed, light showers are fine as long as the incisions are dabbed dry and there is no prolonged water exposure.  Please avoid bathing or submersion of the wounds for 4 weeks.    CATHETER CARE:  Take care of your urethral catheter (“lucio”) as you were taught in the hospital.  The purpose of the lucio catheter is to drain your bladder while the operated area heals. Your lucio will be removed at your follow-up appointment approximately 7-10 days following your surgery.     MEDICATIONS:  1. Please use tylenol and/or advil as need for pain control. Please use oxycodone as prescribed for pain refractory to these meds. Use stool softeners (miralax and colace) as prescribed to prevent constipation.  2. If you are using aspirin, Plavix, or coumadin, please don’t restart these medications until two days after your discharge if you are not having large amounts of blood in the  urine.      FOLLOW-UP:  We will call you to schedule your follow up appointment with Dr. Beckwith or his physician assistant in 7-10 days. If you have not heard from us in 1-2 business days, please call [phone] to schedule your follow-up appointment. You may also contact this number if you have questions or concerns that can be answered by Dr. Beckwith's staff.      WARNING SIGNS:  Problems with lucio catheter, Fever greater than 101 degrees F, chest pain, shortness of breath, chills, nausea or vomiting, Large amount of clots in urine that make it difficult  for urine to drain from lucio, increasing pain, or abdominal swelling. If you are experiencing these symptoms, call the Urology Clinic or go to emergency room.    It is normal to see blood in your urine for up to 2 weeks even from surgery. The urine may clear up entirely, and then turn bloody again a few days later depending on your activity level; do not be alarmed. However, if you experience severe pain or tenderness, have a lot of increased bleeding, or find that you are unable to urinate because of large clots, please notify your doctor immediately      MEDICAL HELP DURING NORMAL BUSINESS HOURS:  Between the hours of 8 AM and 5 PM, please call [phone] to speak with Dr. Beckwith's staff.     MEDICAL HELP AFTER HOURS:  If you have a serious emergency such as chest pain, shortness of breath, relentless pain you should call [phone]. For other urgent problems after hours you may contact the urology physician on call by phoning the [phone]. You may also visit the Emergency room at local hospital for help.     For non-emergent problems such as prescription refills or routine questions, please do your best to contact us during normal business hours. This after-hours number should be used for urgent or emergent questions only.       Nguyen Dahl, P.ÁNGEL.   [address] [place] [address]   [phone]

## 2023-05-18 NOTE — PROGRESS NOTES
4 Eyes Skin Assessment Completed by ADEEL Camacho and ADEEL Gomez.    Head WDL  Ears WDL  Nose WDL  Mouth WDL  Neck WDL  Breast/Chest WDL  Shoulder Blades WDL  Spine WDL  (R) Arm/Elbow/Hand WDL  (L) Arm/Elbow/Hand WDL  Abdomen x5 Visible Lap Sites LUIS M/ LLQ JOSE ARMANDO Drain, Dressing to Insertion Site, Dressing CDI,  Groin CBI in place, Insertion Site CDI, Gauze to Penis   Scrotum/Coccyx/Buttocks WDL  (R) Leg WDL  (L) Leg WDL  (R) Heel/Foot/Toe WDL  (L) Heel/Foot/Toe WDL          Devices In Places Blood Pressure Cuff, Pulse Ox, and SCD's      Interventions In Place Pillows, Heels Loaded W/Pillows, and Pressure Redistribution Mattress    Possible Skin Injury No    Pictures Uploaded Into Epic N/A  Wound Consult Placed N/A  RN Wound Prevention Protocol Ordered No

## 2023-05-18 NOTE — ANESTHESIA PROCEDURE NOTES
Airway    Date/Time: 5/18/2023 7:41 AM    Performed by: John Mason M.D.  Authorized by: John Mason M.D.    Location:  OR  Urgency:  Elective  Indications for Airway Management:  Anesthesia      Spontaneous Ventilation: absent    Sedation Level:  Deep  Preoxygenated: Yes    Patient Position:  Sniffing  Mask Difficulty Assessment:  0 - not attempted  Final Airway Type:  Endotracheal airway  Final Endotracheal Airway:  ETT  Cuffed: Yes    Technique Used for Successful ETT Placement:  Direct laryngoscopy  Devices/Methods Used in Placement:  Intubating stylet    Insertion Site:  Oral  Blade Type:  Donny  Laryngoscope Blade/Videolaryngoscope Blade Size:  3  ETT Size (mm):  7.0  Measured from:  Teeth  ETT to Teeth (cm):  21  Placement Verified by: capnometry    Cormack-Lehane Classification:  Grade IIb - view of arytenoids or posterior of glottis only  Number of Attempts at Approach:  1

## 2023-05-18 NOTE — ANESTHESIA TIME REPORT
Anesthesia Start and Stop Event Times     Date Time Event    5/18/2023 0715 Ready for Procedure     0734 Anesthesia Start     1038 Anesthesia Stop        Responsible Staff  05/18/23    Name Role Begin End    Min ODALYS Mason M.D. Anesth 0734 1038        Overtime Reason:  no overtime (within assigned shift)    Comments:

## 2023-05-19 LAB
ANION GAP SERPL CALC-SCNC: 13 MMOL/L (ref 7–16)
BUN SERPL-MCNC: 16 MG/DL (ref 8–22)
CALCIUM SERPL-MCNC: 8.1 MG/DL (ref 8.5–10.5)
CHLORIDE SERPL-SCNC: 106 MMOL/L (ref 96–112)
CO2 SERPL-SCNC: 21 MMOL/L (ref 20–33)
CREAT SERPL-MCNC: 0.69 MG/DL (ref 0.5–1.4)
ERYTHROCYTE [DISTWIDTH] IN BLOOD BY AUTOMATED COUNT: 43.8 FL (ref 35.9–50)
GFR SERPLBLD CREATININE-BSD FMLA CKD-EPI: 100 ML/MIN/1.73 M 2
GLUCOSE SERPL-MCNC: 107 MG/DL (ref 65–99)
HCT VFR BLD AUTO: 50.1 % (ref 42–52)
HGB BLD-MCNC: 16.6 G/DL (ref 14–18)
MCH RBC QN AUTO: 29 PG (ref 27–33)
MCHC RBC AUTO-ENTMCNC: 33.1 G/DL (ref 33.7–35.3)
MCV RBC AUTO: 87.4 FL (ref 81.4–97.8)
PLATELET # BLD AUTO: 222 K/UL (ref 164–446)
PMV BLD AUTO: 9.6 FL (ref 9–12.9)
POTASSIUM SERPL-SCNC: 4.2 MMOL/L (ref 3.6–5.5)
RBC # BLD AUTO: 5.73 M/UL (ref 4.7–6.1)
SODIUM SERPL-SCNC: 140 MMOL/L (ref 135–145)
WBC # BLD AUTO: 11.7 K/UL (ref 4.8–10.8)

## 2023-05-19 PROCEDURE — A9270 NON-COVERED ITEM OR SERVICE: HCPCS | Performed by: PHYSICIAN ASSISTANT

## 2023-05-19 PROCEDURE — 700102 HCHG RX REV CODE 250 W/ 637 OVERRIDE(OP): Performed by: PHYSICIAN ASSISTANT

## 2023-05-19 PROCEDURE — G0378 HOSPITAL OBSERVATION PER HR: HCPCS

## 2023-05-19 PROCEDURE — 85027 COMPLETE CBC AUTOMATED: CPT

## 2023-05-19 PROCEDURE — 36415 COLL VENOUS BLD VENIPUNCTURE: CPT

## 2023-05-19 PROCEDURE — 80048 BASIC METABOLIC PNL TOTAL CA: CPT

## 2023-05-19 RX ADMIN — ACETAMINOPHEN 1000 MG: 500 TABLET, FILM COATED ORAL at 18:42

## 2023-05-19 RX ADMIN — Medication 1 APPLICATOR: at 18:42

## 2023-05-19 RX ADMIN — FINASTERIDE 5 MG: 5 TABLET, FILM COATED ORAL at 05:03

## 2023-05-19 RX ADMIN — Medication 1 APPLICATOR: at 05:03

## 2023-05-19 RX ADMIN — DOCUSATE SODIUM 100 MG: 100 CAPSULE, LIQUID FILLED ORAL at 05:03

## 2023-05-19 RX ADMIN — DOCUSATE SODIUM 100 MG: 100 CAPSULE, LIQUID FILLED ORAL at 18:42

## 2023-05-19 RX ADMIN — ACETAMINOPHEN 1000 MG: 500 TABLET, FILM COATED ORAL at 05:02

## 2023-05-19 ASSESSMENT — PAIN DESCRIPTION - PAIN TYPE
TYPE: ACUTE PAIN;SURGICAL PAIN
TYPE: ACUTE PAIN

## 2023-05-19 NOTE — PROGRESS NOTES
Assumed care of patient at 0645. Bedside report received. Assessment complete.  AA&Ox4. Denies CP/SOB.  Reporting no pain. Declined intervention at this time. Educated patient regarding pharmacologic and non pharmacologic modalities for pain management.  Skin per flow sheets. CBI running, some dried blood around urethra. 5x lap sites to abdomen. JOSE ARMANDO drain to LUQ  Tolerating diet. Denies N/V.  + void. +F -BM. Last BM PTA  Pt ambulates self. Ambulates regularly in the halls  Fall prevention measures in place per flowsheets.  Ambulating frequently.   Plan of care discussed, all questions answered.  Educated regarding importance of oral care. Oral care kit at bedside. Call light is within reach, treaded slipper socks on, bed in lowest/ locked position, hourly rounding in place, all needs met at this time.

## 2023-05-19 NOTE — DISCHARGE SUMMARY
Discharge Summary    Reason for Admission  BPH with obstruction/lower urinary*     Admission Date  5/18/2023    CODE STATUS  Full Code    HPI & HOSPITAL COURSE  This is a 67 y.o. male who is now POD#1 s/p uncomplicated Robotic Assisted Simple Prostatectomy on 5/18/23 by Dr. Beckwith.    Patient is sitting comfortably up to chair this AM. He reports no pain, controlled with medications. He is tolerating a regular diet without N/V. He has been ambulatory. -flatus, -BM.    Urine is draining light clear cherry with no clots, CBI is clamped.    JOSE ARMANDO drain with 55cc output/24hrs, this will be removed prior to discharge.     Abdomen is soft and non-tender, incisions appear C/D/I.      **Patient did stay an additional night for pain control and CBI, updated by RN evening 5/19 regarding status.    Lucio catheter is to remain securely in place upon discharge.     Therefore, he is discharged in good and stable condition to home with close outpatient follow-up.    Discussed plan of care with patient, RN, and urology team.    Discharge Date  05/20/23    FOLLOW UP ITEMS POST DISCHARGE  Our office will arrange outpatient follow up for lucio catheter management and post-operative care    DISCHARGE DIAGNOSES  Principal Problem:    BPH with obstruction/lower urinary tract symptoms (POA: Yes)  Resolved Problems:    * No resolved hospital problems. *      FOLLOW UP  Tin Beckwith M.D.  [address]  [place] [address]  [phone]    Follow up  Our office will call to arrange outpatient follow up      MEDICATIONS ON DISCHARGE     Medication List        START taking these medications        Instructions   docusate sodium 100 MG Caps  Commonly known as: COLACE   Take 1 Capsule by mouth 2 times a day. while taking narcotics  Dose: 100 mg     oxybutynin SR 10 MG CR tablet  Commonly known as: DITROPAN-XL   Take 1 Tablet by mouth every day for 7 days.  Dose: 10 mg     oxyCODONE immediate-release 5 MG Tabs  Commonly known as:  ROXICODONE   Take 1 Tablet by mouth every four hours as needed for Severe Pain for up to 7 days.  Dose: 5 mg            CONTINUE taking these medications        Instructions   doxycycline 100 MG Tabs  Commonly known as: VIBRAMYCIN   Take 100 mg by mouth 2 times a day.  Dose: 100 mg     finasteride 5 MG Tabs  Commonly known as: PROSCAR   Take 1 Tablet by mouth every day.  Dose: 5 mg     HYDROcodone-acetaminophen 5-325 MG Tabs per tablet  Commonly known as: NORCO   Take 1 Tab by mouth every four hours as needed.  Dose: 1 Tablet            STOP taking these medications      tamsulosin 0.4 MG capsule  Commonly known as: FLOMAX              Allergies  No Known Allergies    DIET  Orders Placed This Encounter   Procedures    Diet Order Diet: Regular     Standing Status:   Standing     Number of Occurrences:   1     Order Specific Question:   Diet:     Answer:   Regular [1]     May resume a regular diet as tolerated.    ACTIVITY  As tolerated.  10-lb lifting restriction for 4-6 weeks     CONSULTATIONS  none    PROCEDURES  Robotic Assisted Simple Prostatectomy    LABORATORY  Lab Results   Component Value Date    SODIUM 140 05/19/2023    POTASSIUM 4.2 05/19/2023    CHLORIDE 106 05/19/2023    CO2 21 05/19/2023    GLUCOSE 107 (H) 05/19/2023    BUN 16 05/19/2023    CREATININE 0.69 05/19/2023        Lab Results   Component Value Date    WBC 11.7 (H) 05/19/2023    HEMOGLOBIN 16.6 05/19/2023    HEMATOCRIT 50.1 05/19/2023    PLATELETCT 222 05/19/2023        Total time of the discharge process exceeds 30 minutes.    Jennifer Anderson PA-C  [facility]

## 2023-05-19 NOTE — CARE PLAN
The patient is Stable - Low risk of patient condition declining or worsening    Shift Goals  Clinical Goals: Safety  Patient Goals: Rest    Progress made toward(s) clinical / shift goals:      Problem: Skin Integrity  Goal: Skin integrity is maintained or improved  Outcome: Progressing  Note: Appropriate interventions in place to maintain/ improve skin integrity.     Problem: Pain - Standard  Goal: Alleviation of pain or a reduction in pain to the patient’s comfort goal  Outcome: Progressing  Flowsheets (Taken 5/18/2023 2000)  Pain Rating Scale (NPRS): 0  Note: Declined pharmacological interventions. Provided extra pillows and blankets for support/ repositioning. Will continue to assess/ treat accordingly.

## 2023-05-20 VITALS
BODY MASS INDEX: 26.37 KG/M2 | HEIGHT: 65 IN | RESPIRATION RATE: 17 BRPM | TEMPERATURE: 98.6 F | WEIGHT: 158.29 LBS | OXYGEN SATURATION: 96 % | SYSTOLIC BLOOD PRESSURE: 117 MMHG | HEART RATE: 75 BPM | DIASTOLIC BLOOD PRESSURE: 77 MMHG

## 2023-05-20 PROCEDURE — A9270 NON-COVERED ITEM OR SERVICE: HCPCS | Performed by: PHYSICIAN ASSISTANT

## 2023-05-20 PROCEDURE — 700102 HCHG RX REV CODE 250 W/ 637 OVERRIDE(OP): Performed by: PHYSICIAN ASSISTANT

## 2023-05-20 PROCEDURE — G0378 HOSPITAL OBSERVATION PER HR: HCPCS

## 2023-05-20 RX ADMIN — Medication 1 APPLICATOR: at 04:25

## 2023-05-20 RX ADMIN — DOCUSATE SODIUM 100 MG: 100 CAPSULE, LIQUID FILLED ORAL at 04:25

## 2023-05-20 RX ADMIN — FINASTERIDE 5 MG: 5 TABLET, FILM COATED ORAL at 04:25

## 2023-05-20 RX ADMIN — ACETAMINOPHEN 1000 MG: 500 TABLET, FILM COATED ORAL at 00:24

## 2023-05-20 RX ADMIN — ACETAMINOPHEN 1000 MG: 500 TABLET, FILM COATED ORAL at 05:31

## 2023-05-20 ASSESSMENT — PAIN DESCRIPTION - PAIN TYPE: TYPE: ACUTE PAIN;SURGICAL PAIN

## 2023-05-20 NOTE — PROGRESS NOTES
Assumed care of patient at 0645. Bedside report received. Assessment complete.  AA&Ox4. Denies CP/SOB.  Reporting no pain. Declined intervention at this time. Educated patient regarding pharmacologic and non pharmacologic modalities for pain management.  Skin per flow sheets. CBI running at slow rate  5x lap sites to abdomen. JOSE ARMANDO drain to LUQ  Tolerating diet. Denies N/V.  + void. +F -BM. Last BM PTA  Pt ambulates self. Ambulates regularly in the halls  Fall prevention measures in place per flowsheets.  Ambulating frequently.   Plan of care discussed, all questions answered.  Educated regarding importance of oral care. Oral care kit at bedside. Call light is within reach, treaded slipper socks on, bed in lowest/ locked position, hourly rounding in place, all needs met at this time.

## 2023-05-20 NOTE — CARE PLAN
Problem: Knowledge Deficit - Standard  Goal: Patient and family/care givers will demonstrate understanding of plan of care, disease process/condition, diagnostic tests and medications  Outcome: Progressing     Problem: Pain - Standard  Goal: Alleviation of pain or a reduction in pain to the patient’s comfort goal  Outcome: Progressing   The patient is Stable - Low risk of patient condition declining or worsening    Shift Goals  Clinical Goals: cbi, pain control and rest  Patient Goals: pain control and rest  Family Goals: update on poc    Progress made toward(s) clinical / shift goals:  patient a+ox4, 3/10 penile pain - no request for prn pain meds at this time, cbi patent and draining - pink tinged - no clots, scant bleeding noted at tip of penis, patient ambulatory in hallway - steady gait, no needs at this time, wctm    Patient is not progressing towards the following goals:n/a    Fall precautions/hourly rounding maintained, call light within reach and functioning, all items within reach.  Patient encouraged to call for assistance, poc reviewed with patient, ?'s/concerns answered.

## 2023-05-20 NOTE — PROGRESS NOTES
Discharging Patient home per physician order.  Discharged with multiple leg bags for lucio, extra blue stopper, large lucio bag for night time, dressing change materials for JOSE ARMANDO site.  Demonstrated understanding of discharge instructions, follow up appointments, home medications, prescriptions, home care for surgical wound, and nursing care instructions for lucio.    Ambulating without assistance, voiding without difficulty, pain well controlled, tolerating oral medications, oxygen saturation greater than 90% , tolerating diet. Educational handouts given and discussed.  Verbalized understanding of discharge instructions and educational handouts.  Stated several reasons why to return to ED or seek medical attention. All questions answered.  Belongings and dressing supplies with patient at time of discharge.  Patient ambulatory and requested to talk out with wife.

## 2023-05-20 NOTE — PROGRESS NOTES
Update:    Patient seen and examined today, did stay additional night for pain control and CBI.    Ambulating, urine draining clear with no clots off of CBI. Patient denies any pain or discomfort.     Plan for discharge today. Discussed with patient and RN.    Jennifer Anderson PA-C  [facility]

## 2023-07-18 ENCOUNTER — HOSPITAL ENCOUNTER (OUTPATIENT)
Facility: MEDICAL CENTER | Age: 68
End: 2023-07-18
Attending: STUDENT IN AN ORGANIZED HEALTH CARE EDUCATION/TRAINING PROGRAM
Payer: COMMERCIAL

## 2023-07-18 PROCEDURE — 87186 SC STD MICRODIL/AGAR DIL: CPT

## 2023-07-18 PROCEDURE — 87086 URINE CULTURE/COLONY COUNT: CPT

## 2023-07-18 PROCEDURE — 87077 CULTURE AEROBIC IDENTIFY: CPT

## 2023-08-03 ENCOUNTER — HOSPITAL ENCOUNTER (OUTPATIENT)
Facility: MEDICAL CENTER | Age: 68
End: 2023-08-03
Attending: PHYSICIAN ASSISTANT
Payer: COMMERCIAL

## 2023-08-03 PROCEDURE — 87086 URINE CULTURE/COLONY COUNT: CPT

## 2023-08-05 LAB
BACTERIA UR CULT: NORMAL
SIGNIFICANT IND 70042: NORMAL
SITE SITE: NORMAL
SOURCE SOURCE: NORMAL

## 2023-08-11 ENCOUNTER — HOSPITAL ENCOUNTER (OUTPATIENT)
Facility: MEDICAL CENTER | Age: 68
End: 2023-08-11
Attending: STUDENT IN AN ORGANIZED HEALTH CARE EDUCATION/TRAINING PROGRAM
Payer: COMMERCIAL

## 2023-08-11 PROCEDURE — 87086 URINE CULTURE/COLONY COUNT: CPT

## 2023-08-13 LAB
BACTERIA UR CULT: NORMAL
SIGNIFICANT IND 70042: NORMAL
SITE SITE: NORMAL
SOURCE SOURCE: NORMAL

## 2023-09-13 ENCOUNTER — HOSPITAL ENCOUNTER (OUTPATIENT)
Dept: LAB | Facility: MEDICAL CENTER | Age: 68
End: 2023-09-13
Attending: STUDENT IN AN ORGANIZED HEALTH CARE EDUCATION/TRAINING PROGRAM
Payer: COMMERCIAL

## 2023-09-13 PROCEDURE — 82565 ASSAY OF CREATININE: CPT

## 2023-09-13 PROCEDURE — 36415 COLL VENOUS BLD VENIPUNCTURE: CPT

## 2023-09-13 PROCEDURE — 84520 ASSAY OF UREA NITROGEN: CPT

## 2023-09-13 PROCEDURE — 87086 URINE CULTURE/COLONY COUNT: CPT

## 2023-09-14 LAB
BUN SERPL-MCNC: 17 MG/DL (ref 8–22)
CREAT SERPL-MCNC: 0.86 MG/DL (ref 0.5–1.4)
GFR SERPLBLD CREATININE-BSD FMLA CKD-EPI: 93 ML/MIN/1.73 M 2

## 2023-09-15 LAB
BACTERIA UR CULT: NORMAL
SIGNIFICANT IND 70042: NORMAL
SITE SITE: NORMAL
SOURCE SOURCE: NORMAL

## 2023-09-18 ENCOUNTER — HOSPITAL ENCOUNTER (OUTPATIENT)
Dept: RADIOLOGY | Facility: MEDICAL CENTER | Age: 68
End: 2023-09-18
Attending: STUDENT IN AN ORGANIZED HEALTH CARE EDUCATION/TRAINING PROGRAM
Payer: COMMERCIAL

## 2023-09-18 DIAGNOSIS — N39.0 URINARY TRACT INFECTION WITHOUT HEMATURIA, SITE UNSPECIFIED: ICD-10-CM

## 2023-09-18 PROCEDURE — 74178 CT ABD&PLV WO CNTR FLWD CNTR: CPT

## 2023-09-18 PROCEDURE — 700117 HCHG RX CONTRAST REV CODE 255: Performed by: STUDENT IN AN ORGANIZED HEALTH CARE EDUCATION/TRAINING PROGRAM

## 2023-09-18 RX ADMIN — IOHEXOL 100 ML: 350 INJECTION, SOLUTION INTRAVENOUS at 08:06

## 2023-12-27 ENCOUNTER — HOSPITAL ENCOUNTER (OUTPATIENT)
Facility: MEDICAL CENTER | Age: 68
End: 2023-12-27
Attending: UROLOGY
Payer: COMMERCIAL

## 2023-12-27 LAB — PSA SERPL-MCNC: 1.4 NG/ML (ref 0–4)

## 2023-12-27 PROCEDURE — 84153 ASSAY OF PSA TOTAL: CPT

## 2024-06-14 ENCOUNTER — HOSPITAL ENCOUNTER (OUTPATIENT)
Facility: MEDICAL CENTER | Age: 69
End: 2024-06-14
Attending: UROLOGY
Payer: COMMERCIAL

## 2024-06-14 LAB — PSA SERPL-MCNC: 1.45 NG/ML (ref 0–4)

## 2024-06-14 PROCEDURE — 84153 ASSAY OF PSA TOTAL: CPT

## 2025-04-26 NOTE — ANESTHESIA PREPROCEDURE EVALUATION
Case: [id number] Date/Time: 05/18/23 0715    Procedure: ROBOT ASSISTED LAPAROSCOPIC SIMPLE PROSTATECTOMY    Pre-op diagnosis: RETENTION OF URINE    Location: [place] [address] / SURGERY [facility]    Surgeons: Tin Beckwith M.D.      66 yo M w/ lower urinary sx, otherwise healthy. NPO. METS >4. Not on AC.    Relevant Problems   No relevant active problems       Physical Exam    Airway   Mallampati: II  TM distance: >3 FB  Neck ROM: full       Cardiovascular - normal exam  Rhythm: regular  Rate: normal  (-) murmur     Dental   (+) upper dentures, lower dentures           Pulmonary - normal exam  Breath sounds clear to auscultation     Abdominal    Neurological - normal exam               Anesthesia Plan    ASA 2       Plan - general       Airway plan will be ETT          Induction: intravenous    Postoperative Plan: Postoperative administration of opioids is intended.    Pertinent diagnostic labs and testing reviewed    Informed Consent:    Anesthetic plan and risks discussed with patient.    Use of blood products discussed with: patient whom consented to blood products.          Addended by: MATTHEW BORREGO on: 4/26/2025 03:44 PM     Modules accepted: Orders

## (undated) DEVICE — DRIVER LARGE NEEDLE (15UN/EA)

## (undated) DEVICE — TOWELS CLOTH SURGICAL - (4/PK 20PK/CA)

## (undated) DEVICE — APPLICATOR ENDOSCOPIC SURGICEL (5EA/BX)

## (undated) DEVICE — SET TUBING PNEUMOCLEAR HIGH FLOW SMOKE EVACUATION (10EA/BX)

## (undated) DEVICE — SYRINGE 30 ML LL (56/BX)

## (undated) DEVICE — SUTURE 0 VICRYL PLUS CT-1 - 36 INCH (36/BX)

## (undated) DEVICE — ROBOTIC SURGERY SERVICES

## (undated) DEVICE — SUTURE 2-0 ETHILON FS - (36/BX) 18 INCH

## (undated) DEVICE — SCISSORS 5MM CVD (6EA/BX)

## (undated) DEVICE — SEAL 5MM-8MM UNIVERSAL  BOX OF 10

## (undated) DEVICE — SUCTION INSTRUMENT YANKAUER BULBOUS TIP W/O VENT (50EA/CA)

## (undated) DEVICE — SUTURE 3-0 15CM STRATAFIX SPIRAL RB-1 (12EA/BX)

## (undated) DEVICE — SUTURE GENERAL

## (undated) DEVICE — CANISTER SUCTION 3000ML MECHANICAL FILTER AUTO SHUTOFF MEDI-VAC NONSTERILE LF DISP  (40EA/CA)

## (undated) DEVICE — PACK TRENGUARD 450 PROCEDURE (12EA/CA)

## (undated) DEVICE — SUTURE 2-0 30CM STRATAFIX SPIRAL PDO ***WAS PART #SXPD1B401 *****

## (undated) DEVICE — GOWN WARMING STANDARD FLEX - (30/CA)

## (undated) DEVICE — DERMABOND ADVANCED - (12EA/BX)

## (undated) DEVICE — SENSOR OXIMETER ADULT SPO2 RD SET (20EA/BX)

## (undated) DEVICE — GLOVE BIOGEL INDICATOR SZ 7SURGICAL PF LTX - (50/BX 4BX/CA)

## (undated) DEVICE — SHEARS MONOPOLAR CURVED  DA VINCI 10X'S REUSABLE

## (undated) DEVICE — CATHETER URETHRAL FOLEY LATEX 22 FR 30 CC 3-WAY (12/CA)

## (undated) DEVICE — DRAPE ARM  BOX OF 20

## (undated) DEVICE — FORCEPS PROGRASP (18UN/EA)

## (undated) DEVICE — NEEDLE INSFL 120MM 14GA VRRS - (20/BX)

## (undated) DEVICE — TROCAR Z THREAD12MM OPTICAL - NON BLADED (6/BX)

## (undated) DEVICE — COVER TIP ENDOWRIST HOT SHEAR - (10EA/BX) DA VINCI

## (undated) DEVICE — GLOVE BIOGEL PI INDICATOR SZ 6.0 SURGICAL PF LF -(200PR/CA)

## (undated) DEVICE — LACTATED RINGERS INJ 1000 ML - (14EA/CA 60CA/PF)

## (undated) DEVICE — ARMREST CRADLE FOAM - (2PR/PK 12PR/CA)

## (undated) DEVICE — SLEEVE VASO CALF MED - (10PR/CA)

## (undated) DEVICE — ELECTRODE DUAL RETURN W/ CORD - (50/PK)

## (undated) DEVICE — FORCEPS TENACULUM DA VINCI 10X'S REUSABLE

## (undated) DEVICE — SET IRRIGATION CYSTOSCOPY Y-TYPE L81 IN (20EA/CA)

## (undated) DEVICE — SUTURE 3-0 VICRYL PLUS RB-1 - (36/BX)

## (undated) DEVICE — SET SUCTION/IRRIGATION WITH DISPOSABLE TIP (6/CA )PART #0250-070-520 IS A SUB

## (undated) DEVICE — CATHETER URETHRAL FOLEY SILICONE OD16 FR 10 ML (10EA/CA)

## (undated) DEVICE — HEMOSTAT ABSORBABLE POWDER SURGICEL 3G (5EA/BX)

## (undated) DEVICE — SYSTEM CLEARIFY VISUALIZATION (10EA/PK)

## (undated) DEVICE — DRAIN JACKSON PRATT 15FR - (10EA/CA)

## (undated) DEVICE — SET LEADWIRE 5 LEAD BEDSIDE DISPOSABLE ECG (1SET OF 5/EA)

## (undated) DEVICE — PACK DAVINCI PROSTATECTOMY - (1EA/CA)

## (undated) DEVICE — SET EXTENSION WITH 2 PORTS (48EA/CA) ***PART #2C8610 IS A SUBSTITUTE*****

## (undated) DEVICE — COVER LIGHT HANDLE ALC PLUS DISP (18EA/BX)

## (undated) DEVICE — BAG RETRIEVAL 10ML (10EA/BX)

## (undated) DEVICE — PAD OR TABLE DA VINCI 2IN X 20IN X 72IN - (12EA/CA)

## (undated) DEVICE — TROCAR 5X100 NON BLADED Z-TH - READ KII (6/BX)

## (undated) DEVICE — GLOVE SZ 6 BIOGEL PI MICRO - PF LF (50PR/BX 4BX/CA)

## (undated) DEVICE — GLOVE BIOGEL SZ 7 SURGICAL PF LTX - (50PR/BX 4BX/CA)

## (undated) DEVICE — TUBING CLEARLINK DUO-VENT - C-FLO (48EA/CA)

## (undated) DEVICE — DRAPE COLUMN  BOX OF 20

## (undated) DEVICE — SUTURE 4-0 MONOCRYL PLUS PS-1 - 27 INCH (36/BX)

## (undated) DEVICE — OBTURATOR BLADELESS STANDARD 8MM (6EA/BX)